# Patient Record
Sex: MALE | Race: WHITE
[De-identification: names, ages, dates, MRNs, and addresses within clinical notes are randomized per-mention and may not be internally consistent; named-entity substitution may affect disease eponyms.]

---

## 2017-03-05 ENCOUNTER — HOSPITAL ENCOUNTER (EMERGENCY)
Dept: HOSPITAL 45 - C.EDB | Age: 82
Discharge: HOME | End: 2017-03-05
Payer: COMMERCIAL

## 2017-03-05 VITALS — TEMPERATURE: 97.52 F

## 2017-03-05 VITALS — HEART RATE: 83 BPM | OXYGEN SATURATION: 98 % | SYSTOLIC BLOOD PRESSURE: 116 MMHG | DIASTOLIC BLOOD PRESSURE: 78 MMHG

## 2017-03-05 VITALS
HEIGHT: 67.99 IN | HEIGHT: 67.99 IN | BODY MASS INDEX: 24.86 KG/M2 | WEIGHT: 164.02 LBS | WEIGHT: 164.02 LBS | BODY MASS INDEX: 24.86 KG/M2

## 2017-03-05 DIAGNOSIS — S49.92XA: Primary | ICD-10-CM

## 2017-03-05 DIAGNOSIS — I48.91: ICD-10-CM

## 2017-03-05 DIAGNOSIS — Z79.899: ICD-10-CM

## 2017-03-05 DIAGNOSIS — M54.5: ICD-10-CM
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
## 2017-03-05 DIAGNOSIS — Z90.79: ICD-10-CM

## 2017-03-05 DIAGNOSIS — Z85.46: ICD-10-CM

## 2017-03-05 DIAGNOSIS — Z85.72: ICD-10-CM

## 2017-03-05 DIAGNOSIS — Z79.01: ICD-10-CM

## 2017-03-05 DIAGNOSIS — Z86.718: ICD-10-CM

## 2017-03-05 NOTE — DIAGNOSTIC IMAGING REPORT
LUMBAR SPINE 5 VIEWS



HISTORY: Trauma  fall eval for fx



COMPARISON: None.



FINDINGS: There is no fracture. No subluxation. Significant degenerative disc

change. Significant degenerative change of vertebral endplates.



IMPRESSION:  

Significant degenerative change. No acute process.







Electronically signed by:  Raj Powell M.D.

3/5/2017 4:07 PM



Dictated Date/Time:  3/5/2017 4:06 PM

## 2017-03-05 NOTE — DIAGNOSTIC IMAGING REPORT
LEFT CLAVICLE



CLINICAL HISTORY: left eval for fx trauma. Pain.



COMPARISON: None.



DISCUSSION: The bones and joint spaces appear intact. There is no evidence of

fracture, dislocation or bony disease. Moderate degenerative change

acromioclavicular joint. Cortical margins are intact.



IMPRESSION: No acute process.







Electronically signed by:  Raj Powell M.D.

3/5/2017 4:08 PM



Dictated Date/Time:  3/5/2017 4:07 PM

## 2017-03-05 NOTE — DIAGNOSTIC IMAGING REPORT
LEFT ELBOW MIN 3 VIEWS ROUTINE



CLINICAL HISTORY: fall eval for fx trauma. Pain.



COMPARISON: None.



DISCUSSION: The bones and joint spaces appear intact. There is no evidence of

fracture, dislocation or bony disease. Mild degenerative change. Bone spur

projecting from the proximal ulna.



IMPRESSION: No acute bony abnormality







Electronically signed by:  Raj Powell M.D.

3/5/2017 4:09 PM



Dictated Date/Time:  3/5/2017 4:08 PM

## 2017-03-05 NOTE — EMERGENCY ROOM VISIT NOTE
History


Report prepared by [place]:  Juan Mendenhall


Under the Supervision of:  Dr. Jose Miguel eLroy M.D.


First contact with patient:  14:43


Chief Complaint:  FALL


Stated Complaint:  SORE CLAVICAL AND LWR RIGHT BACK SIDE-FROM A FALL





History of Present Illness


The patient is an 88 year old male who presents to the Emergency Room with 

complaints of a persistent sore clavicle that started yesterday after a fall at 

1000. The patient says that he was standing on a step stool trying to get 

something off the top shelf of a closet, when he lost his balance and fell onto 

his left arm and shoulder.  He lives at the [facility] at [facility], so he had 

one of the nurses put a bandage on his left elbow. Ever since the fall, he has 

had a sore clavicle and lower back pain. He states that his lower back pain is 

in the muscles.  Yesterday, he had bilateral lower back pain, but today, the 

pain has localized to the lower right side. The patient did have elbow pain for 

a few hours after the fall, but the pain has gone away. He denies any loss of 

consciousness, headache, neck pain, hip pain, or hematuria. He did not hit his 

head. The patient is on Coumadin. He has atrial fibrillation.





   Source of History:  patient


   Onset:  Yesterday at 1000


   Position:  other (clavicle)


   Quality:  other (soreness)


   Timing:  other (persistent)


   Associated Symptoms:  + back pain (lower), No LOC, No headache, No neck pain

, No urinary symptoms (hematuria)


Note:


Associated symptoms: Had left elbow pain for a few hours after the fall. Denies 

hip pain.





Review of Systems


See HPI for pertinent positives & negatives. A total of 10 systems reviewed and 

were otherwise negative.





Past Medical & Surgical


Medical Problems:


(1) Atrial fibrillation


(2) CHF (congestive heart failure)


(3) DVT (deep venous thrombosis)


(4) H/O blood clots


(5) Heart disease


(6) Lyme disease


(7) Mantle cell lymphoma


(8) Non-Hodgkin lymphoma


(9) Prostate cancer


(10) Shingles


Surgical Problems:


(1) H/O prostatectomy


(2) S/P appendectomy








Family History





Diabetes mellitus


FH: cancer


FH: heart disease


FH: lung disease


Hypertension





Social History


Smoking Status:  Never Smoker


Alcohol Use:  occasionally


Marital Status:  


Housing Status:  lives alone


Occupation Status:  unemployed





Current/Historical Medications


Scheduled


Bicalutamide (Casodex), 50 MG PO DAILY


Calcium Carbonate-Vitamin D (Calcium + D), 1 TAB PO DAILY


Fish Oil ([place]-3), 1 CAP PO DAILY


Gabapentin (Neurontin), 300 MG PO BID


Leuprolide Acetate (Lupron Depot), 7.5 MG INJ Q4MO


Multivitamin (Multivitamin), 1 TAB PO DAILY


Warfarin Sod (Jantoven), 7.5 MG PO 4XWK


Warfarin Sodium (Coumadin), 5 MG PO 3XWK





Scheduled PRN


Furosemide (Furosemide), 0-40 MG PO DAILY PRN for Weight Gain Of 3#


Ondansetron (Ondansetron HCl), 8 MG PO UD PRN for Nausea or Vomiting





Allergies


Coded Allergies:  


     Acetazolamide (Verified  Allergy, Unknown, Diamox Eye Drops ? rxn, 5/26/15)





Physical Exam


Vital Signs











  Date Time  Temp Pulse Resp B/P Pulse Ox O2 Delivery O2 Flow Rate FiO2


 


3/5/17 16:26  83 16 116/78 98   


 


3/5/17 14:30 36.4 80 18 119/82 99 Room Air  











Physical Exam





Constitutional: Vital signs reviewed.


Eyes: Pupils are equal round reactive to light.  Conjunctiva are noninjected.  


ENT: Pharynx is clear without erythema or exudate.  Mucous membranes are moist.

  Neck supple without meningeal signs.


Respiratory: Clear to auscultation bilaterally.  Breath sounds are equal 

bilaterally. 


Cardiovascular: Regular rate and rhythm.  No rubs or gallops.


GI: Soft, nondistended and nontender.  Bowel sounds are present.


Musculoskeletal: No peripheral edema.  No lower extremity tenderness.  Proximal 

clavicular tenderness.  No sternal tenderness.  No midline tenderness to 

cervical, thoracic, or lumbosacral spine. No hip tenderness.


Integumentary: No cyanosis. Skin tear left elbow.


Neurological: The patient is awake and alert.  No focal deficits.


Psychiatric: Normal affect.





Medical Decision & Procedures


ER Provider


Diagnostic Interpretation:


X-ray results as stated below per interpretation by me and the radiologist: 








LUMBAR SPINE 5 VIEWS





HISTORY: Trauma  fall eval for fx





COMPARISON: None.





FINDINGS: There is no fracture. No subluxation. Significant degenerative disc


change. Significant degenerative change of vertebral endplates.





IMPRESSION:  


Significant degenerative change. No acute process.





Electronically signed by:  Raj Powell M.D.


3/5/2017 4:07 PM





Dictated Date/Time:  3/5/2017 4:06 PM











LEFT ELBOW MIN 3 VIEWS ROUTINE





CLINICAL HISTORY: fall eval for fx trauma. Pain.





COMPARISON: None.





DISCUSSION: The bones and joint spaces appear intact. There is no evidence of


fracture, dislocation or bony disease. Mild degenerative change. Bone spur


projecting from the proximal ulna.





IMPRESSION: No acute bony abnormality





Electronically signed by:  Raj Powell M.D.


3/5/2017 4:09 PM





Dictated Date/Time:  3/5/2017 4:08 PM














LEFT CLAVICLE





CLINICAL HISTORY: left eval for fx trauma. Pain.





COMPARISON: None.





DISCUSSION: The bones and joint spaces appear intact. There is no evidence of


fracture, dislocation or bony disease. Moderate degenerative change


acromioclavicular joint. Cortical margins are intact.





IMPRESSION: No acute process.





Electronically signed by:  Raj Powell M.D.


3/5/2017 4:08 PM





Dictated Date/Time:  3/5/2017 4:07 PM





ED Course


1447: The patient was evaluated in room B2. A complete history and physical 

exam was performed.





1614: I reevaluated the patient and talked to him about his test results. He is 

resting comfortably. The patient verbally expressed understanding and agreement 

of the treatment plan. The patient will be discharged.





Medical Decision


This is an 88-year-old male who presents with injuries after a fall.  

Differential diagnosis includes contusion, strain, compression fracture, 

clavicular fracture.  I did perform a limited focused review of portions of the 

patient's old chart on the electronic medical record. The patient had an INR of 

2.2 on February 1st.





I did evaluate the patient as noted above.  Patient had a mechanical fall 

yesterday.  He injured his left arm and his back.  He denies any head injury or 

headache.  He denies any neck pain.  I did order and personally review the 

patient's x-rays as described above.  The patient does not have any acute 

fractures.  I did discuss the test results with the patient.  I did recommend 

Tylenol for pain.  He was given an arm sling and advised follow up with his 

doctor.  He was discharged in good condition.





Impression





 Primary Impression:  


 Left upper arm injury


 Additional Impressions:  


 Low back pain


 Fall





Scribe Attestation


The scribe's documentation has been prepared under my direct and personally 

reviewed by me in its entirety. I confirm that the note above accurately 

reflects all work, treatment, procedures, and medical decision making performed 

by me.





Departure Information


Dispostion


Home / Self-Care





Referrals


No Doctor, Assigned (PCP)








Oliver Chen M.D.





Forms


HOME CARE DOCUMENTATION FORM,                                                 

               IMPORTANT VISIT INFORMATION





Patient Instructions


Low Back Pain Self Care, My [facility]





Additional Instructions





You have been examined and treated today on an emergency basis only. This is 

not a substitute for, or an effort to provide, complete comprehensive medical 

care. It is impossible to recognize and treat all injuries or illnesses in a 

single emergency department visit. It is therefore important that you follow up 

closely with your physician.  Call as soon as possible for an appointment.  

Return for worsening symptoms or if you develop fever, vomiting, headache or 

numbness or weakness in your arms or legs or any other concerning symptoms.





Problem Qualifiers








 Primary Impression:  


 Left upper arm injury


 Encounter type:  initial encounter  Qualified Codes:  S49.92XA - Unspecified 

injury of left shoulder and upper arm, initial encounter


 Additional Impressions:  


 Low back pain


 Chronicity:  acute  Back pain laterality:  right  Sciatica presence:  without 

sciatica  Qualified Codes:  M54.5 - Low back pain


 Fall


 Encounter type:  initial encounter  Qualified Codes:  W19.XXXA - Unspecified 

fall, initial encounter

## 2017-03-23 ENCOUNTER — HOSPITAL ENCOUNTER (OUTPATIENT)
Dept: HOSPITAL 45 - C.LABSPEC | Age: 82
Discharge: HOME | End: 2017-03-23
Attending: PHYSICIAN ASSISTANT
Payer: COMMERCIAL

## 2017-03-23 DIAGNOSIS — Z79.01: ICD-10-CM

## 2017-03-23 DIAGNOSIS — I82.409: ICD-10-CM
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
## 2017-03-23 DIAGNOSIS — Z51.81: ICD-10-CM

## 2017-03-23 DIAGNOSIS — S51.802A: Primary | ICD-10-CM

## 2017-07-26 ENCOUNTER — HOSPITAL ENCOUNTER (OUTPATIENT)
Dept: HOSPITAL 45 - C.RAD1850 | Age: 82
Discharge: HOME | End: 2017-07-26
Attending: INTERNAL MEDICINE
Payer: COMMERCIAL

## 2017-07-26 DIAGNOSIS — Z79.01: ICD-10-CM

## 2017-07-26 DIAGNOSIS — Z51.81: ICD-10-CM

## 2017-07-26 DIAGNOSIS — I82.409: ICD-10-CM

## 2017-07-26 DIAGNOSIS — M54.5: Primary | ICD-10-CM

## 2017-07-26 NOTE — DIAGNOSTIC IMAGING REPORT
L-SPINE MIN 4 VIEWS ROUTINE



CLINICAL HISTORY: 89 years-old Male presenting with low back pain, fall one week

ago. 



TECHNIQUE: Frontal, bilateral oblique, and lateral views of the lumbar spine and

coned-down lateral view of the lumbosacral junction were obtained. 



COMPARISON:  3/15/2017.



FINDINGS:

Vertebral body heights and alignment maintained. Intervertebral disc height loss

at L5-S1 as on prior exam. Possible mild bilateral osseous neural foraminal

narrowing at L5 S1. Mild endplate concavities could suggest osteoporosis. Mild

degenerative changes with anterior osteophytosis. Numerous surgical clips noted

in the pelvis, possibly from prior prostatectomy. Atherosclerosis.

Nonobstructive bowel gas pattern.



IMPRESSION:

Degenerative changes of the lumbar spine most severe at L5-S1.



Suggestion of osteoporosis without evidence of compression deformity.







Electronically signed by:  Gurmeet Llanes M.D.

7/26/2017 4:58 PM



Dictated Date/Time:  7/26/2017 4:56 PM

## 2017-08-08 ENCOUNTER — HOSPITAL ENCOUNTER (OUTPATIENT)
Dept: HOSPITAL 45 - C.LAB1850 | Age: 82
Discharge: HOME | End: 2017-08-08
Attending: PHYSICIAN ASSISTANT
Payer: COMMERCIAL

## 2017-08-08 DIAGNOSIS — R53.83: ICD-10-CM

## 2017-08-08 DIAGNOSIS — R39.9: Primary | ICD-10-CM

## 2017-08-08 LAB
ALBUMIN/GLOB SERPL: 1 {RATIO} (ref 0.9–2)
ALP SERPL-CCNC: 179 U/L (ref 45–117)
ALT SERPL-CCNC: 36 U/L (ref 12–78)
ANION GAP SERPL CALC-SCNC: 5 MMOL/L (ref 3–11)
APPEARANCE UR: CLEAR
AST SERPL-CCNC: 31 U/L (ref 15–37)
BASOPHILS # BLD: 0.03 K/UL (ref 0–0.2)
BASOPHILS NFR BLD: 0.5 %
BILIRUB UR-MCNC: (no result) MG/DL
BUN SERPL-MCNC: 26 MG/DL (ref 7–18)
BUN/CREAT SERPL: 20.2 (ref 10–20)
CALCIUM SERPL-MCNC: 9.3 MG/DL (ref 8.5–10.1)
CHLORIDE SERPL-SCNC: 103 MMOL/L (ref 98–107)
CO2 SERPL-SCNC: 32 MMOL/L (ref 21–32)
COLOR UR: YELLOW
COMPLETE: YES
CREAT SERPL-MCNC: 1.3 MG/DL (ref 0.6–1.4)
EOSINOPHIL NFR BLD AUTO: 212 K/UL (ref 130–400)
GLOBULIN SER-MCNC: 3.5 GM/DL (ref 2.5–4)
GLUCOSE SERPL-MCNC: 123 MG/DL (ref 70–99)
HCT VFR BLD CALC: 43.9 % (ref 42–52)
IG%: 0.2 %
IMM GRANULOCYTES NFR BLD AUTO: 27.3 %
LYMPHOCYTES # BLD: 1.72 K/UL (ref 1.2–3.4)
MANUAL MICROSCOPIC REQUIRED?: NO
MCH RBC QN AUTO: 30.6 PG (ref 25–34)
MCHC RBC AUTO-ENTMCNC: 32.1 G/DL (ref 32–36)
MCV RBC AUTO: 95.2 FL (ref 80–100)
MONOCYTES NFR BLD: 7.9 %
NEUTROPHILS # BLD AUTO: 3.8 %
NEUTROPHILS NFR BLD AUTO: 60.3 %
NITRITE UR QL STRIP: (no result)
PH UR STRIP: 6 [PH] (ref 4.5–7.5)
PMV BLD AUTO: 11.4 FL (ref 7.4–10.4)
POTASSIUM SERPL-SCNC: 4.3 MMOL/L (ref 3.5–5.1)
RBC # BLD AUTO: 4.61 M/UL (ref 4.7–6.1)
REVIEW REQ?: NO
SODIUM SERPL-SCNC: 140 MMOL/L (ref 136–145)
SP GR UR STRIP: 1.02 (ref 1–1.03)
URINE EPITHELIAL CELL AUTO: >30 /LPF (ref 0–5)
UROBILINOGEN UR-MCNC: (no result) MG/DL
WBC # BLD AUTO: 6.3 K/UL (ref 4.8–10.8)

## 2017-08-11 ENCOUNTER — HOSPITAL ENCOUNTER (EMERGENCY)
Dept: HOSPITAL 45 - C.EDB | Age: 82
Discharge: HOME | End: 2017-08-11
Payer: COMMERCIAL

## 2017-08-11 ENCOUNTER — HOSPITAL ENCOUNTER (OUTPATIENT)
Dept: HOSPITAL 45 - C.LAB1850 | Age: 82
Discharge: HOME | End: 2017-08-11
Attending: PHYSICIAN ASSISTANT
Payer: COMMERCIAL

## 2017-08-11 VITALS — TEMPERATURE: 97.52 F

## 2017-08-11 VITALS
HEIGHT: 69.02 IN | WEIGHT: 172.4 LBS | BODY MASS INDEX: 25.53 KG/M2 | HEIGHT: 69.02 IN | WEIGHT: 172.4 LBS | BODY MASS INDEX: 25.53 KG/M2

## 2017-08-11 VITALS — SYSTOLIC BLOOD PRESSURE: 160 MMHG | OXYGEN SATURATION: 98 % | HEART RATE: 82 BPM | DIASTOLIC BLOOD PRESSURE: 103 MMHG

## 2017-08-11 DIAGNOSIS — Z79.01: ICD-10-CM

## 2017-08-11 DIAGNOSIS — Z86.718: ICD-10-CM

## 2017-08-11 DIAGNOSIS — R79.9: ICD-10-CM

## 2017-08-11 DIAGNOSIS — R79.9: Primary | ICD-10-CM

## 2017-08-11 DIAGNOSIS — Z83.3: ICD-10-CM

## 2017-08-11 DIAGNOSIS — Z90.79: ICD-10-CM

## 2017-08-11 DIAGNOSIS — I48.91: ICD-10-CM

## 2017-08-11 DIAGNOSIS — Z85.72: ICD-10-CM

## 2017-08-11 DIAGNOSIS — Z80.9: ICD-10-CM

## 2017-08-11 DIAGNOSIS — Z82.49: ICD-10-CM

## 2017-08-11 DIAGNOSIS — E86.0: Primary | ICD-10-CM

## 2017-08-11 DIAGNOSIS — Z79.899: ICD-10-CM

## 2017-08-11 DIAGNOSIS — I50.9: ICD-10-CM

## 2017-08-11 DIAGNOSIS — Z85.46: ICD-10-CM

## 2017-08-11 LAB
ALP SERPL-CCNC: 182 U/L (ref 45–117)
ALT SERPL-CCNC: 33 U/L (ref 12–78)
ANION GAP SERPL CALC-SCNC: 7 MMOL/L (ref 3–11)
ANION GAP SERPL CALC-SCNC: 8 MMOL/L (ref 3–11)
APPEARANCE UR: CLEAR
AST SERPL-CCNC: 30 U/L (ref 15–37)
BILIRUB UR-MCNC: (no result) MG/DL
BUN SERPL-MCNC: 30 MG/DL (ref 7–18)
BUN SERPL-MCNC: 30 MG/DL (ref 7–18)
BUN/CREAT SERPL: 20.1 (ref 10–20)
BUN/CREAT SERPL: 21.1 (ref 10–20)
CALCIUM SERPL-MCNC: 9.6 MG/DL (ref 8.5–10.1)
CALCIUM SERPL-MCNC: 9.7 MG/DL (ref 8.5–10.1)
CHLORIDE SERPL-SCNC: 104 MMOL/L (ref 98–107)
CHLORIDE SERPL-SCNC: 104 MMOL/L (ref 98–107)
CO2 SERPL-SCNC: 28 MMOL/L (ref 21–32)
CO2 SERPL-SCNC: 29 MMOL/L (ref 21–32)
COLOR UR: YELLOW
CREAT CL PREDICTED SERPL C-G-VRATE: 35.8 ML/MIN
CREAT SERPL-MCNC: 1.4 MG/DL (ref 0.6–1.4)
CREAT SERPL-MCNC: 1.5 MG/DL (ref 0.6–1.4)
GLUCOSE SERPL-MCNC: 100 MG/DL (ref 70–99)
GLUCOSE SERPL-MCNC: 159 MG/DL (ref 70–99)
MANUAL MICROSCOPIC REQUIRED?: NO
NITRITE UR QL STRIP: (no result)
PH UR STRIP: 6.5 [PH] (ref 4.5–7.5)
POINT OF CARE PRO-BNP: 1632 PG/ML (ref 0–1800)
POINT OF CARE TROPONIN I: < 0.03 NG/ML (ref 0–0.04)
POTASSIUM SERPL-SCNC: 4.4 MMOL/L (ref 3.5–5.1)
POTASSIUM SERPL-SCNC: 4.4 MMOL/L (ref 3.5–5.1)
REVIEW REQ?: NO
SODIUM SERPL-SCNC: 140 MMOL/L (ref 136–145)
SODIUM SERPL-SCNC: 140 MMOL/L (ref 136–145)
SP GR UR STRIP: 1.01 (ref 1–1.03)
URINE BILL WITH OR WITHOUT MIC: (no result)
URINE EPITHELIAL CELL AUTO: >30 /LPF (ref 0–5)
UROBILINOGEN UR-MCNC: (no result) MG/DL

## 2017-08-11 NOTE — DIAGNOSTIC IMAGING REPORT
CHEST ONE VIEW PORTABLE



HISTORY: Atypical  CHEST PAIN



COMPARISON: Chest 9/15/2016.



FINDINGS: No focal lung consolidations to suggest pneumonia. No evidence for

pulmonary edema. No pleural effusions. No pneumothorax. The heart remains mildly

enlarged. Old, healed right rib fractures.



IMPRESSION:

Stable mild cardiomegaly. No acute process within the chest.







Electronically signed by:  Geremias Schmidt M.D.

8/11/2017 6:14 PM



Dictated Date/Time:  8/11/2017 6:13 PM

## 2017-08-11 NOTE — EMERGENCY ROOM VISIT NOTE
History


Report prepared by [place]:  Maliha Vázquez


Under the Supervision of:  Dr. Juan Carlos Bran M.D.


First contact with patient:  17:47


Chief Complaint:  HYPOTENSION


Stated Complaint:  LOW BP,MT&W,LIGHTHEADED,DEHYDRATED





History of Present Illness


The patient is an 89 year old male who presents to the Emergency Room with 

complaints of persistent low blood pressure starting 4 days ago. He has had 

episodes of low blood pressure 3-4 times in the past. He saw his PCP yesterday 

and had blood work. He received a call today saying that he should go to the ED 

over concerns of dehydration and kidney problems. He reports feeling 

lightheaded at times especially with standing up. He has had dysuria for a 

couple weeks. He denies any fever, black or bloody stools, chest pain, SOB, or 

abdominal pain. He denies any recent falls or injury. He believes that he is 

keeping up with his fluids, but his family does not think he does. He has a 

history of CHF and is on a diuretic. He does not have more leg swelling than 

usual. He denies any history of diabetes.





   Source of History:  patient, family


   Onset:  4 days ago


   Position:  other (global)


   Quality:  other (low blood pressure)


   Timing:  other (persistent)


   Associated Symptoms:  + urinary symptoms, No fevers, No chest pain, No SOB, 

No abdominal pain, No melena, No hematochezia


Note:


Pt reports feeling lightheaded, dysuria.





Review of Systems


See HPI for pertinent positives & negatives. A total of 10 systems reviewed and 

were otherwise negative.





Past Medical & Surgical


Medical Problems:


(1) Atrial fibrillation


(2) CHF (congestive heart failure)


(3) DVT (deep venous thrombosis)


(4) H/O blood clots


(5) Heart disease


(6) Lyme disease


(7) Mantle cell lymphoma


(8) Non-Hodgkin lymphoma


(9) Prostate cancer


(10) Shingles


Surgical Problems:


(1) H/O prostatectomy


(2) S/P appendectomy





Old medical records were reviewed. Nurse's notes were reviewed and I agree with.





Family History





Diabetes mellitus


FH: cancer


FH: heart disease


FH: lung disease


Hypertension





Social History


Smoking Status:  Never Smoker


Alcohol Use:  occasionally


Marital Status:  


Housing Status:  lives alone


Occupation Status:  unemployed





Current/Historical Medications


Scheduled


Bicalutamide (Casodex), 50 MG PO DAILY


Calcium Carbonate-Vitamin D (Calcium + D), 1 TAB PO DAILY


Fish Oil ([place]-3), 1 CAP PO DAILY


Gabapentin (Neurontin), 300 MG PO BID


Leuprolide Acetate (Lupron Depot), 7.5 MG INJ Q4MO


Multivitamin (Multivitamin), 1 TAB PO DAILY


Warfarin Sodium (Coumadin), 5 MG PO 6XWK


Warfarin Sodium (Coumadin), 7.5 MG PO tues





Scheduled PRN


Furosemide (Furosemide), 0-40 MG PO DAILY PRN for Weight Gain Of 3#


Ondansetron (Ondansetron HCl), 8 MG PO UD PRN for Nausea or Vomiting





Allergies


Coded Allergies:  


     Acetazolamide (Verified  Allergy, Unknown, Diamox Eye Drops ? rxn, 8/11/17)





Physical Exam


Vital Signs











  Date Time  Temp Pulse Resp B/P (MAP) Pulse Ox O2 Delivery O2 Flow Rate FiO2


 


8/11/17 21:01  82 18 160/103 98   


 


8/11/17 18:53  83 18 179/91 97 Room Air  


 


8/11/17 16:24 36.4 77 16 130/82 100 Room Air  











Physical Exam


General: Non ill appearing older male in no acute distress. Well developed well 

nourished, breathing comfortably on room air. Normal speech


HEENT: Normal cephalic atraumatic.  Pupils are equal round and reactive to 

light.  Extraocular movements are intact.  Oropharynx is pink with moist mucous 

membranes.  No swelling of the mouth lips or tongue.


Neck: Supple with a midline trachea.  No meningeal signs or stiffness, no JVD 

or bruits. No Stridor.


Chest: Clear to auscultation bilaterally.  No wheezes or rhonchi.  No increased 

work of breathing.


Heart: regular rate and rhythm. 


Abdomen: Soft nontender, nondistended without rebound guarding or rigidity.  


Extremities: No cyanosis clubbing.  Mild baseline peripheral edema. No calf 

tenderness or assymetry


Spine/Back. Non tender to palpation. No CVA tenderness


Skin: Good turgor without rashes.


Neurologic exam: Cranial nerves two through 12 are intact.  Motor and sensation 

are intact and symmetrical throughout.





Medical Decision & Procedures


ER Provider


Diagnostic Interpretation:


X-ray results as stated below per interpretation by me and the radiologist: 





CHEST ONE VIEW PORTABLE





HISTORY: Atypical  CHEST PAIN





COMPARISON: Chest 9/15/2016.





FINDINGS: No focal lung consolidations to suggest pneumonia. No evidence for


pulmonary edema. No pleural effusions. No pneumothorax. The heart remains mildly


enlarged. Old, healed right rib fractures.





IMPRESSION:


Stable mild cardiomegaly. No acute process within the chest.





Electronically signed by:  Geremias Schmidt M.D.


8/11/2017 6:14 PM





Dictated Date/Time:  8/11/2017 6:13 PM





Laboratory Results


8/11/17 18:18

















Test


  8/11/17


18:18 8/11/17


18:25


 


Anion Gap


  7.0 mmol/L


(3-11) 


 


 


Est Creatinine Clear Calc


Drug Dose 35.8 ml/min 


  


 


 


Estimated GFR (


American) 51.3 


  


 


 


Estimated GFR (Non-


American 44.2 


  


 


 


BUN/Creatinine Ratio 21.1 (10-20)  


 


Calcium Level


  9.7 mg/dl


(8.5-10.1) 


 


 


Total Bilirubin


  0.8 mg/dl


(0.2-1) 


 


 


Direct Bilirubin


  0.2 mg/dl


(0-0.2) 


 


 


Aspartate Amino Transf


(AST/SGOT) 30 U/L (15-37) 


  


 


 


Alanine Aminotransferase


(ALT/SGPT) 33 U/L (12-78) 


  


 


 


Alkaline Phosphatase


  182 U/L


() 


 


 


Total Protein


  7.7 gm/dl


(6.4-8.2) 


 


 


Albumin


  3.8 gm/dl


(3.4-5.0) 


 


 


Lipase


  173 U/L


() 


 


 


Urine Color  YELLOW 


 


Urine Appearance  CLEAR (CLEAR) 


 


Urine pH  6.5 (4.5-7.5) 


 


Urine Specific Gravity


  


  1.013


(1.000-1.030)


 


Urine Protein  NEG (NEG) 


 


Urine Glucose (UA)  NEG (NEG) 


 


Urine Ketones  NEG (NEG) 


 


Urine Occult Blood  NEG (NEG) 


 


Urine Nitrite  NEG (NEG) 


 


Urine Bilirubin  NEG (NEG) 


 


Urine Urobilinogen  NEG (NEG) 


 


Urine Leukocyte Esterase  SMALL (NEG) 


 


Urine WBC (Auto)


  


  10-30 /hpf


(0-5)


 


Urine RBC (Auto)  0-4 /hpf (0-4) 


 


Urine Hyaline Casts (Auto)  1-5 /lpf (0-5) 


 


Urine Epithelial Cells (Auto)  >30 /lpf (0-5) 


 


Urine Bacteria (Auto)  NEG (NEG) 


 


Bedside Troponin I


  


  < 0.030 ng/ml


(0-0.045)


 


NT-Pro-B-Type Natriuretic


Peptide 


  1632 pg/ml


(0-1800)





Laboratory studies as stated above per my review.





Medications Administered











 Medications


  (Trade)  Dose


 Ordered  Sig/Maria Ines


 Route  Start Time


 Stop Time Status Last Admin


Dose Admin


 


 Sodium Chloride  500 ml @ 


 999 mls/hr  Q31M STAT


 IV  8/11/17 17:57


 8/11/17 18:27 DC 8/11/17 18:52


999 MLS/HR


 


 Sodium Chloride  1,000 ml @ 


 150 mls/hr  Q6H40M ONCE


 IV  8/11/17 17:57


 8/11/17 21:23 DC 8/11/17 19:23


150 MLS/HR











ECG


Indication:  weakness


Rate (beats per minute):  76


Rhythm:  atrial fibrillation


Findings:  PVC, other (nonspecific T wave abnormality)


Comparison ECG Date:  23-Mar-2016


Change:  no significant change





ED Course


1748: Past medical records reviewed. The patient was evaluated in room C12A, 

and a complete history and physical examination were performed.





1757: NSS 1000 ml @ 150 mls/hr IV,  ml @ 999 mls/hr IV. 





2018: Upon reevaluation, the patient is doing well. I discussed the results and 

treatment plan with him and his family. They verbalized agreement of the 

treatment plan. The patient was discharged home.





Medical Decision


Differentials include, but are not limited to; dehydration, electrolyte or 

metabolic abnormality, arrhythmia, infection, anemia. 





This patient comes in as described above.  He was placed on cardiac monitor and 

room [facility] by his doctor for IV hydration.  His BUN/creatinine been 

mildly elevated compared to baseline.  He apparently was hypotensive early the 

week but is normotensive here.  He looks great and he has no complaints.  He 

has had no chest pain or shortness of breath .  No fever or chills or anything 

to suggest infection.  His white count earlier today was normal. His hemoglobin 

was stable as well.  He had an INR checked yesterday was in the 3 range. he's 

had no fall or trauma . he's had no blood or melena in his stool. He has had no 

abdominal pain or anything to suggest he is losing blood anywhere.  EKG shows 

baseline A. fib but has no acute ischemic changes or other arrhythmia.  He has 

no other significant lab abnormalities.  He was gently hydrated with a 500 mL 

IV normal saline bolus and received approximately about 750 mL total.  His 

lungs were are clear and his chest x-ray does not show overt CHF.  He feels 

good and would like to go home. I will discharge him home.  He should increase 

his fluid intake slightly over this weekend and return if: fever or chills, 

worsening of symptoms, any new problems or concerns and follow-up with his 

doctor Monday for recheck.  He was happy the plan and discharged to home.





Medication Reconcilliation


Current Medication List:  was personally reviewed by me





Blood Pressure Screening


Patient's blood pressure:  Normal blood pressure


Blood pressure disposition:  Did not require urgent referral





Impression





 Primary Impression:  


 Dehydration





Scribe Attestation


The scribe's documentation has been prepared under my direction and personally 

reviewed by me in its entirety. I confirm that the note above accurately 

reflects all work, treatment, procedures, and medical decision making performed 

by me.





Departure Information


Dispostion


Home / Self-Care





Referrals


Oliver Chen M.D. (PCP)





Forms


HOME CARE DOCUMENTATION FORM,                                                 

               IMPORTANT VISIT INFORMATION, WORK / SCHOOL INSTRUCTIONS





Patient Instructions


My [facility]





Additional Instructions





Rest.


Increased fluid intake over the weekend





Be careful getting up and down.





Return if: Worsening of symptoms, chest pain, shortness of breath, dizziness, 

blood in your stool, pain, any new problems or concerns





Follow-up with your doctor on Monday for recheck

## 2017-09-12 LAB
ALBUMIN/GLOB SERPL: 1 {RATIO} (ref 0.9–2)
ALP SERPL-CCNC: 163 U/L (ref 45–117)
ALT SERPL-CCNC: 28 U/L (ref 12–78)
ANION GAP SERPL CALC-SCNC: 6 MMOL/L (ref 3–11)
AST SERPL-CCNC: 33 U/L (ref 15–37)
BUN SERPL-MCNC: 23 MG/DL (ref 7–18)
BUN/CREAT SERPL: 20.5 (ref 10–20)
CALCIUM SERPL-MCNC: 9.5 MG/DL (ref 8.5–10.1)
CHLORIDE SERPL-SCNC: 104 MMOL/L (ref 98–107)
CO2 SERPL-SCNC: 30 MMOL/L (ref 21–32)
CREAT SERPL-MCNC: 1.1 MG/DL (ref 0.6–1.4)
GLOBULIN SER-MCNC: 3.8 GM/DL (ref 2.5–4)
GLUCOSE SERPL-MCNC: 99 MG/DL (ref 70–99)
POTASSIUM SERPL-SCNC: 3.8 MMOL/L (ref 3.5–5.1)
PSA FREE SERPL-MCNC: 0.03 NG/ML
PSA SERPL-MCNC: 0.28 NG/ML (ref 0–4)
SODIUM SERPL-SCNC: 140 MMOL/L (ref 136–145)

## 2017-09-14 ENCOUNTER — HOSPITAL ENCOUNTER (OUTPATIENT)
Dept: HOSPITAL 45 - C.CTS | Age: 82
Discharge: HOME | End: 2017-09-14
Attending: UROLOGY
Payer: COMMERCIAL

## 2017-09-14 DIAGNOSIS — K80.20: ICD-10-CM

## 2017-09-14 DIAGNOSIS — K57.90: ICD-10-CM

## 2017-09-14 DIAGNOSIS — R32: ICD-10-CM

## 2017-09-14 DIAGNOSIS — N32.89: ICD-10-CM

## 2017-09-14 DIAGNOSIS — R31.29: ICD-10-CM

## 2017-09-14 DIAGNOSIS — C61: Primary | ICD-10-CM

## 2017-09-14 NOTE — DIAGNOSTIC IMAGING REPORT
******** ADDENDUM ********





Possible focus of asymmetric enhancement at the right side of the junction of

the bladder base/urethra best seen on image 402. This measures approximately 12

mm. This could be due to collapse of the normal bladder base. However, a focal

area of tumor recurrence given the patient's history of prostate malignancy

cannot be excluded. This may account for the patient's possible bladder outlet

obstruction. Direct visualization is recommended for further evaluation. This

finding was called/faxed to the referring physician's office.







Electronically signed by:  Geremias Schmidt M.D.

9/14/2017 9:22 AM



Dictated Date/Time:  9/14/2017 9:19 AM



******** ORIGINAL REPORT ********





ABDOMEN AND PELVIS CT WITH AND WITHOUT IV CONTRAST, UROGRAM PROTOCOL



CT DOSE: 1471.26 mGycm



HISTORY:      HEMATURIA,INCONTINENCE,NEOPLASM OF PROSTATE



TECHNIQUE: Multiaxial CT images of the abdomen and pelvis were performed both

before and after the use of intravenous contrast to evaluate the urinary system.

Maximal intensity projection images were performed at the workstation by the

radiologist.  A dose lowering technique was utilized adhering to the principles

of ALARA.



COMPARISON STUDY: Abdomen and pelvis CT 1/20/2011.



FINDINGS: No renal or ureteral calculi. No hydronephrosis.  No suspicious

filling defects seen within the bilateral renal collecting systems, ureters, or

bladder. Of note, the distal bilateral ureters and bladder not well opacified.

Moderate to severe bladder distention. 

 

Old mild superior endplate compression deformity at L2. A 4 mm subpleural nodule

within the right middle lobe on image 28. Mild dependent changes seen at the

lung bases. Calcified granuloma within the left lower lobe. Bilateral posterior

pleural thickening. No suspicious lytic or blastic osseous lesions. The heart is

mildly enlarged. There are few diverticula at the duodenum. Calcified gallstone.

Punctate calcified granuloma seen within the spleen. Slightly nodular contour to

the liver consistent with cirrhosis. The adrenal glands and pancreas are

unremarkable. Mild bilateral cortical renal scarring. Otherwise, the kidneys

enhance normally. Subcentimeter retroperitoneal lymph nodes do not meet CT

criteria for pathologic involvement. Moderate calcified plaque within the

arterial structures. No pelvic lymphadenopathy. Postoperative changes consistent

with prior prostatectomy and pelvic lymph node dissection. Colonic

diverticulosis. No bowel wall thickening or obstruction.







IMPRESSION: 



1. No renal or ureteral stones. No hydronephrosis.

2. No suspicious filling defects seen within the opacified bilateral renal

collecting systems, ureters, or bladder as described above. 3. Moderate to

severe bladder distention.

4. Prior prostatectomy.

5. Cholelithiasis.

6. Slightly nodular contour to the liver consistent with cirrhosis.

7.. Colonic diverticulosis







Electronically signed by:  Geremias Schmidt M.D.

9/14/2017 9:12 AM



Dictated Date/Time:  9/14/2017 9:00 AM

## 2017-09-16 LAB — TESTOST SERPL-MCNC: <1 NG/DL (ref 250–1100)

## 2017-09-18 ENCOUNTER — HOSPITAL ENCOUNTER (OUTPATIENT)
Dept: HOSPITAL 45 - C.LABSPEC | Age: 82
Discharge: HOME | End: 2017-09-18
Attending: UROLOGY
Payer: COMMERCIAL

## 2017-09-18 DIAGNOSIS — R32: ICD-10-CM

## 2017-09-18 DIAGNOSIS — C61: Primary | ICD-10-CM

## 2017-09-18 DIAGNOSIS — R31.29: ICD-10-CM

## 2017-10-02 ENCOUNTER — HOSPITAL ENCOUNTER (OUTPATIENT)
Dept: HOSPITAL 45 - C.NUCL | Age: 82
Discharge: HOME | End: 2017-10-02
Attending: UROLOGY
Payer: COMMERCIAL

## 2017-10-02 DIAGNOSIS — R31.0: ICD-10-CM

## 2017-10-02 DIAGNOSIS — R93.7: ICD-10-CM

## 2017-10-02 DIAGNOSIS — R33.9: ICD-10-CM

## 2017-10-02 DIAGNOSIS — C61: Primary | ICD-10-CM

## 2017-10-02 NOTE — DIAGNOSTIC IMAGING REPORT
WHOLE BODY BONE SCAN



HISTORY:      C61 Adenocarcinoma of prostate R33.9 Urinary [id number] Gross



RADIOTRACER:  27 mCi Tc-99m MDP



STUDY/IMAGES:  Planar anterior and posterior whole body imaging was performed 3

hours following the intravenous administration of radiotracer.     



COMPARISON:  PET CT 3/9/2016.



FINDINGS: Possible small foci of radiotracer uptake seen within the right

anterior and lateral ribs. These have the typical appearance of old fractures.

Sternoclavicular, right knee, and ankle radiotracer uptake favors degenerative

change. Small focal areas of radiotracer uptake seen within the thoracic and

lumbar spine which is nonspecific but also favors degenerative change. No

suspicious areas of radiotracer uptake seen within the axial or appendicular

skeleton.



IMPRESSION:  



1. No suspicious areas of radiotracer uptake seen within the axial or

appendicular skeleton to suggest metastatic disease.

2. Additional foci of radiotracer uptake favor posttraumatic and degenerative

changes. 







Electronically signed by:  Geremias Schmidt M.D.

10/2/2017 2:18 PM



Dictated Date/Time:  10/2/2017 2:16 PM

## 2017-10-20 ENCOUNTER — HOSPITAL ENCOUNTER (OUTPATIENT)
Dept: HOSPITAL 45 - C.LABVPSUA | Age: 82
Discharge: NURSING, REHAB OR LONG-TERM CARE | End: 2017-10-20
Attending: INTERNAL MEDICINE
Payer: COMMERCIAL

## 2017-10-20 DIAGNOSIS — I48.91: Primary | ICD-10-CM

## 2017-10-20 LAB
INR PPP: 1.1 (ref 0.9–1.1)
PROTHROMBIN TIME: 11.4 SECONDS (ref 9–12)

## 2017-10-23 ENCOUNTER — HOSPITAL ENCOUNTER (OUTPATIENT)
Dept: HOSPITAL 45 - C.LABVPSUA | Age: 82
Discharge: HOME | End: 2017-10-23
Attending: INTERNAL MEDICINE
Payer: COMMERCIAL

## 2017-10-23 DIAGNOSIS — I48.91: Primary | ICD-10-CM

## 2017-10-23 LAB
INR PPP: 1.1 (ref 0.9–1.1)
PROTHROMBIN TIME: 12.2 SECONDS (ref 9–12)

## 2017-10-26 ENCOUNTER — HOSPITAL ENCOUNTER (OUTPATIENT)
Dept: HOSPITAL 45 - C.LABVPSUA | Age: 82
Discharge: HOME | End: 2017-10-26
Attending: INTERNAL MEDICINE
Payer: COMMERCIAL

## 2017-10-26 DIAGNOSIS — I48.91: Primary | ICD-10-CM

## 2017-10-26 LAB
INR PPP: 1.6 (ref 0.9–1.1)
PROTHROMBIN TIME: 18 SECONDS (ref 9–12)

## 2017-11-13 ENCOUNTER — HOSPITAL ENCOUNTER (OUTPATIENT)
Dept: HOSPITAL 45 - C.CTS | Age: 82
Discharge: HOME | End: 2017-11-13
Attending: INTERNAL MEDICINE
Payer: COMMERCIAL

## 2017-11-13 DIAGNOSIS — R59.9: ICD-10-CM

## 2017-11-13 DIAGNOSIS — Z85.46: Primary | ICD-10-CM

## 2017-11-13 DIAGNOSIS — I51.7: ICD-10-CM

## 2017-11-13 NOTE — DIAGNOSTIC IMAGING REPORT
******** ADDENDUM ********





ADDITIONAL FINDINGS:

Upon further review a focal segment of small bowel is noted in the superior

anterior pelvis which demonstrate significant wall thickening. The segment of

involved small bowel is not extensive in length measuring approximately less

than 10 cm. No narrowing of the lumen. Mild overall expansion of the bowel

diameter. No other sites of small bowel wall thickening evident. Mild

perienteric fat stranding most evident along the mesenteric aspect where there

is associated multiple small lymph nodes.



ADDITIONAL IMPRESSION:

5. Focal segment of small bowel wall thickening with associated prominent lymph

nodes and mild fat infiltration. Differential considerations include

lymphomatous involvement versus enteritis. Neoplastic involvement by lymphoma is

favored given the appearance and focality.



This finding was discussed by Dr. Hoffmann with Dr. Zhou on the left/14/2017.







Electronically signed by:  Gurmeet Llanes M.D.

11/14/2017 6:04 PM



Dictated Date/Time:  11/14/2017 6:02 PM



******** ORIGINAL REPORT ********





ABD/PELVIS IV AND ORAL CONT



CLINICAL HISTORY: 89 years-old Male presenting with LYMPHOMA. 



TECHNIQUE: Multidetector CT of the abdomen and pelvis was performed after the

administration of oral and intravenous contrast. IV contrast: 93 mL of Optiray

320. A dose lowering technique was used consistent with the principles of ALARA

(as low as reasonably achievable). 



COMPARISON: 9/14/2017.



CT DOSE (mGy.cm): The estimated cumulative dose is 692.20 mGy.cm.



FINDINGS:



 topogram: Unremarkable.



Lung bases: Minimal dependent changes likely atelectasis. Mosaic attenuation at

the lung bases suggest small airways disease. Calcified granuloma noted in the

left lower lobe. Multichamber enlargement of the heart. Aortic valve, mitral

annular, and coronary artery calcification. No pericardial or pleural effusion. 



Liver: Mild macronodular undersurface of the liver.



Biliary: No intrahepatic or extrahepatic biliary ductal dilatation. Gallbladder

contains gallstones.



Pancreas: Moderate parenchymal atrophy.



Spleen: Multiple parenchymal calcification suggest prior granulomatous

infection.



Adrenal glands: Normal.



Kidneys and ureters: Normal. No hydronephrosis.



Bladder: Incompletely evaluated secondary to underdistention. Allowing for

underdistention, circumferential bladder wall thickening is suggested. Focal

hypodensity now evident at the ureteral anastomosis where previously

hyperenhancement was seen. No nodular enhancement at the anastomosis.



Pelvic organs: Postsurgical changes of prostatectomy.



Bowel: Diverticulosis of the sigmoid and descending colon. Moderate stool

burden. No bowel obstruction.



Peritoneal cavity: No free fluid or intraperitoneal gas.



Lymph nodes: Few prominent mesenteric lymph nodes noted in the superior pelvis,

similar to prior exam although direct comparison is slightly difficult due to

the change in distribution from movement of small bowel. The largest lymph node

measures 9 mm in short axis (series 7 image 296). Scattered subcentimeter

retroperitoneal lymph nodes noted unchanged from prior exam.



Vasculature: Atherosclerosis of the normal caliber abdominal aorta. IVC patent.

Atherosclerosis narrows the origins of the major branch vessels.



Abdominal wall: Gynecomastia noted on the left. 



Musculoskeletal: Degenerative changes of the spine. Sclerosis in the lateral

right fifth through eighth ribs in a linear distribution likely prior fractures.

No destructive osseous lesion. Severe osteopenia.



IMPRESSION:

1.  Mildly prominent mesenteric lymph nodes, which are not pathologically

enlarged by CT size criteria. Attention on follow-up. No other evidence to

suggest lymphadenopathy in the abdomen or pelvis.



2.  Interval resolution of previously noted nodular hyperenhancement at the

ureteral anastomosis.



3.  Postsurgical changes of prostatectomy.



4.  Bladder wall thickening may be secondary to prior chronic outlet

obstruction.











Electronically signed by:  Gurmeet Llanes M.D.

11/13/2017 3:18 PM



Dictated Date/Time:  11/13/2017 3:11 PM

## 2017-11-13 NOTE — DIAGNOSTIC IMAGING REPORT
CHEST CT WITH CONTRAST



HISTORY: Follow-up study in a patient with lymphoma  LYMPHOMA



TECHNIQUE: Multiaxial CT images of the chest were performed following the

intravenous administration of contrast.  A dose lowering technique was utilized

adhering to the principles of ALARA.



COMPARISON:  CT chest 4/8/2015, PET CT 4/13/2015 and 3/9/2016.



FINDINGS: 

 No dominant thyroid nodule identified. There is no pathologic adenopathy

identified. Heart is moderately enlarged. Coronary arterial calcifications are

noted. Calcifications of the aortic annulus are also present. Moderate plaquing

at the origin of the left common carotid artery appears to cause approximately

50% narrowing. The opacified pulmonary arterial tree is unremarkable. There is

no pathologically enlarged adenopathy about the chest identified.



There is no pneumothorax or pleural effusion. Calcified glomus are noted. Mild

dependent bibasilar atelectasis. Linear subsegmental opacities of the lung bases

may reflect atelectasis or areas of pleural-parenchymal scarring. There are a

few pleural-based areas of nodular density within the lungs bilaterally. For

example there is a pleural-based 3 mm pulmonary nodule right middle lobe image

183 series 6 which is unchanged. 2 mm nodule of the right lung apex, image 28 of

series 6 also unchanged. No new or suspicious pulmonary nodules or masses. The

central airways are patent.



Cholelithiasis without CT evidence of acute cholecystitis. No acute abnormality

of the imaged upper abdomen. Soft tissues are unremarkable. There are

calcifications throughout the spleen compatible with prior granulomatous

disease. No suspicious lytic or blastic bony lesions. Multilevel endplate

degenerative changes of the spine.



IMPRESSION: 

1. No acute intrathoracic abnormality identified. No pathologic adenopathy.

2. Evidence of prior granulomatous disease. 

3. Cardiomegaly.







Electronically signed by:  Saroj Brown M.D.

11/13/2017 3:44 PM



Dictated Date/Time:  11/13/2017 3:37 PM

## 2017-11-22 ENCOUNTER — HOSPITAL ENCOUNTER (OUTPATIENT)
Dept: HOSPITAL 45 - C.MRIBC | Age: 82
Discharge: HOME | End: 2017-11-22
Attending: RADIOLOGY
Payer: COMMERCIAL

## 2017-11-22 DIAGNOSIS — C79.89: ICD-10-CM

## 2017-11-22 DIAGNOSIS — C61: Primary | ICD-10-CM

## 2017-11-22 DIAGNOSIS — C79.51: ICD-10-CM

## 2017-11-22 NOTE — DIAGNOSTIC IMAGING REPORT
PROSTATE MRI COMBO



CLINICAL HISTORY: 89 years-old Male presenting with PROSTATE CA.



TECHNIQUE: Multisequence, multiplanar MR imaging of the prostate was performed

before and after the administration of intravenous contrast. IV contrast: 7.5 cc

of Gadavist area



COMPARISON: Abdomen and pelvis CT 11/13/2017.



FINDINGS:



The patient is status post prostatectomy. Metallic artifact of the prostatectomy

bed results in suboptimal evaluation. There are no areas of abnormal enhancement

at the prostatectomy bed to suggest recurrent disease. Mild smooth enhancement

at the ureteral anastomosis. No nodular areas of enhancement identified. No soft

tissue masses identified within the prostatectomy bed. Trace pelvic free fluid.

Colonic diverticulosis. No pelvic lymphadenopathy. Fat-containing left inguinal

hernia. Axial T2 fat sat sequences demonstrate multiple hyperintense lesions

seen within the lower lumbar spine, pelvis/sacrum, and bilateral proximal femurs

consistent with metastatic disease. Dominant lesion within the left side of the

sacrum measures 2.6 cm. There is a focal segment of thickened small bowel seen

within the mid lower abdomen. There is mild surrounding edema.







IMPRESSION:





1. No evidence for recurrent/residual disease within the prostatectomy bed.

2. Multiple metastatic lesions seen throughout the visualized osseous structures

of the pelvis, sacrum, and bilateral femurs.

3. Redemonstration of the focal thickened loop of small bowel within the

midabdomen. This may correspond to the patient's history of lymphoma.







Electronically signed by:  Geremias Schmidt M.D.

11/22/2017 12:54 PM



Dictated Date/Time:  11/22/2017 12:33 PM

## 2017-12-13 ENCOUNTER — HOSPITAL ENCOUNTER (OUTPATIENT)
Dept: HOSPITAL 45 - C.PET | Age: 82
Discharge: HOME | End: 2017-12-13
Attending: INTERNAL MEDICINE
Payer: COMMERCIAL

## 2017-12-13 DIAGNOSIS — R93.7: ICD-10-CM

## 2017-12-13 DIAGNOSIS — K63.89: ICD-10-CM

## 2017-12-13 DIAGNOSIS — I88.0: ICD-10-CM

## 2017-12-13 DIAGNOSIS — K80.20: ICD-10-CM

## 2017-12-13 DIAGNOSIS — C79.51: Primary | ICD-10-CM

## 2017-12-13 NOTE — DIAGNOSTIC IMAGING REPORT
PET/CT



CLINICAL HISTORY: Unspecified neoplasm of bone.



COMPARISON STUDY: CT scan of the chest, abdomen, and pelvis dated 11/13/2017.

Nuclear bone scan dated 10/2/2017. PET/CT dated 3/9/2016. Prostate MRI dated

11/22/2017.



TECHNIQUE: One hour following the IV administration of 10.27 mCi of F-18 NaF,

whole body PET/CT examination was performed from the vertex to the feet.

Noncontrast CT is performed for the purposes of anatomic correlation and

attenuation correction. Note that this does not reflect a diagnostic CT

examination. Images were reviewed on a separate [employer] independent workstation.

Fused images were obtained. Standard uptake values reported are maximum values

within the region of interest expressed in gm/mL.



FINDINGS:



PET FINDINGS:



NaF skeletal findings: There is diffusely heterogeneous marrow activity. No

focal lesion is clearly identified. This may represent diffuse disease when

correlated with the pelvic MRI but is indeterminant. Disease was much better

seen by MRI. Typically degenerative activity seen throughout the spine. Activity

is identified within numerous nonacute right-sided rib fractures. Arthritic

activity is seen in the ankle joints.



Unenhanced CT images: The brain parenchyma is normal as visualized noting

age-related involutional change. The bony orbits are grossly intact. There are

bilateral ocular lens implants. There is mild to moderate mucosal thickening

within the left maxillary antrum. Trace mucosal thickening is seen in the right

maxillary antrum. The remaining paranasal sinuses are clear. The mastoid air

cells are well pneumatized. The salivary and thyroid glands are normal as

visualized. No cervical lymphadenopathy is seen. There is atherosclerotic

calcification of the carotid bulbs. There is moderate atherosclerotic

calcification of the thoracic aorta which is normal in caliber. The heart is

enlarged and without pericardial effusion. The coronary arteries are densely

calcified. There is no mediastinal, hilar, or axillary lymphadenopathy. No

airspace consolidation or pleural effusion is seen. There is bibasilar scarring

versus atelectasis. Scattered calcified granulomas are identified. A 3 mm

pleural-based nodule is noted in the right middle lobe on image #132. The

unenhanced liver is grossly unremarkable. There are numerous calcified

gallstones. The unenhanced pancreas is atrophic. The spleen is normal in size

and there are numerous calcified splenic granulomas. The kidneys are atrophic

and without hydronephrosis. The adrenal glands are grossly unremarkable. The

abdominal aorta is normal in caliber noting advanced atherosclerotic

calcification. No bowel obstruction is seen. There is a thick walled loop of

small bowel in the pelvis seen on image #222. There are enlarged mesenteric

lymph nodes surrounding this abnormal loop. The largest is seen on image #212

and measures 4.0 x 2.2 cm. There is a small duodenal diverticulum. There is

advanced sigmoid diverticulosis without CT evidence of acute diverticulitis. The

bladder is normal as visualized. The prostate gland is surgically absent. There

is no retroperitoneal, pelvic sidewall, or inguinal lymphadenopathy. The

skeletal structures are osteopenic. Multilevel degenerative change is present

throughout the spine. There is a compression deformity of L2. There are numerous

nonacute right-sided rib fractures. The lower extremity soft tissues are normal

as imaged.





IMPRESSION:



1. There is diffusely heterogeneous/patchy marrow activity, greatest throughout

the spine and involving the bony pelvis. No focal lesion is clearly identified.

This is nonspecific and likely represents diffuse osseous metastatic disease

when correlated with the recent pelvic MRI. These lesions were much better seen

by MRI.



2. Again seen is a markedly thick-walled loop of distal ileum in the pelvis.

There is increasing surrounding mesenteric lymphadenopathy, and the appearance

is highly concerning for lymphoma. Ileitis is considered much less likely due to

the time course and progressive adenopathy.



3. Cholelithiasis.



4. Additional findings as above.







Electronically signed by:  Howard Stratton M.D.

12/13/2017 1:59 PM



Dictated Date/Time:  12/13/2017 1:28 PM

## 2017-12-27 ENCOUNTER — HOSPITAL ENCOUNTER (INPATIENT)
Dept: HOSPITAL 45 - C.EDB | Age: 82
LOS: 1 days | Discharge: HOME | DRG: 309 | End: 2017-12-28
Attending: FAMILY MEDICINE | Admitting: HOSPITALIST
Payer: COMMERCIAL

## 2017-12-27 VITALS
HEIGHT: 69 IN | BODY MASS INDEX: 25.73 KG/M2 | WEIGHT: 173.72 LBS | HEIGHT: 69 IN | BODY MASS INDEX: 25.73 KG/M2 | WEIGHT: 173.72 LBS

## 2017-12-27 DIAGNOSIS — I25.10: ICD-10-CM

## 2017-12-27 DIAGNOSIS — I50.22: ICD-10-CM

## 2017-12-27 DIAGNOSIS — I48.0: Primary | ICD-10-CM

## 2017-12-27 DIAGNOSIS — Z82.49: ICD-10-CM

## 2017-12-27 DIAGNOSIS — Z90.79: ICD-10-CM

## 2017-12-27 DIAGNOSIS — R10.13: ICD-10-CM

## 2017-12-27 DIAGNOSIS — Z86.718: ICD-10-CM

## 2017-12-27 DIAGNOSIS — Z85.72: ICD-10-CM

## 2017-12-27 DIAGNOSIS — Z98.890: ICD-10-CM

## 2017-12-27 DIAGNOSIS — Z79.899: ICD-10-CM

## 2017-12-27 DIAGNOSIS — R07.2: ICD-10-CM

## 2017-12-27 DIAGNOSIS — R94.31: ICD-10-CM

## 2017-12-27 DIAGNOSIS — Z83.3: ICD-10-CM

## 2017-12-27 DIAGNOSIS — Z83.6: ICD-10-CM

## 2017-12-27 DIAGNOSIS — Z79.01: ICD-10-CM

## 2017-12-27 DIAGNOSIS — I42.9: ICD-10-CM

## 2017-12-27 DIAGNOSIS — Z85.46: ICD-10-CM

## 2017-12-27 DIAGNOSIS — I87.2: ICD-10-CM

## 2017-12-27 LAB
ALBUMIN SERPL-MCNC: 3.1 GM/DL (ref 3.4–5)
ALP SERPL-CCNC: 175 U/L (ref 45–117)
ALT SERPL-CCNC: 21 U/L (ref 12–78)
AST SERPL-CCNC: 35 U/L (ref 15–37)
BASOPHILS # BLD: 0.04 K/UL (ref 0–0.2)
BASOPHILS NFR BLD: 0.7 %
BUN SERPL-MCNC: 17 MG/DL (ref 7–18)
CALCIUM SERPL-MCNC: 8.6 MG/DL (ref 8.5–10.1)
CK MB SERPL-MCNC: 2.3 NG/ML (ref 0.5–3.6)
CO2 SERPL-SCNC: 26 MMOL/L (ref 21–32)
CREAT SERPL-MCNC: 1.17 MG/DL (ref 0.6–1.4)
EOS ABS #: 0.16 K/UL (ref 0–0.5)
EOSINOPHIL NFR BLD AUTO: 169 K/UL (ref 130–400)
GLUCOSE SERPL-MCNC: 150 MG/DL (ref 70–99)
HCT VFR BLD CALC: 38.3 % (ref 42–52)
HGB BLD-MCNC: 12.9 G/DL (ref 14–18)
IG#: 0.03 K/UL (ref 0–0.02)
IMM GRANULOCYTES NFR BLD AUTO: 21.9 %
INR PPP: 2.4 (ref 0.9–1.1)
KETONES UR QL STRIP: 108 MG/DL
LYMPHOCYTES # BLD: 1.34 K/UL (ref 1.2–3.4)
MCH RBC QN AUTO: 31.2 PG (ref 25–34)
MCHC RBC AUTO-ENTMCNC: 33.7 G/DL (ref 32–36)
MCV RBC AUTO: 92.5 FL (ref 80–100)
MONO ABS #: 0.62 K/UL (ref 0.11–0.59)
MONOCYTES NFR BLD: 10.1 %
NEUT ABS #: 3.92 K/UL (ref 1.4–6.5)
NEUTROPHILS # BLD AUTO: 2.6 %
NEUTROPHILS NFR BLD AUTO: 64.2 %
PH UR: 164 MG/DL (ref 0–200)
PMV BLD AUTO: 10.9 FL (ref 7.4–10.4)
POTASSIUM SERPL-SCNC: 3.8 MMOL/L (ref 3.5–5.1)
PROT SERPL-MCNC: 6.6 GM/DL (ref 6.4–8.2)
PTT PATIENT: 41.3 SECONDS (ref 21–31)
RED CELL DISTRIBUTION WIDTH CV: 13.8 % (ref 11.5–14.5)
RED CELL DISTRIBUTION WIDTH SD: 47 FL (ref 36.4–46.3)
SODIUM SERPL-SCNC: 139 MMOL/L (ref 136–145)
WBC # BLD AUTO: 6.11 K/UL (ref 4.8–10.8)

## 2017-12-27 NOTE — DIAGNOSTIC IMAGING REPORT
SINGLE VIEW CHEST



CLINICAL HISTORY:  Generalized weakness.



FINDINGS: An AP, portable, upright chest radiograph is compared to study dated

8/11/2017 and correlated with chest CT dated 11/13/2017. The examination is

degraded by portable technique and apical lordotic positioning.  The heart is

enlarged and there is atherosclerotic calcification of the thoracic aorta. The

pulmonary vasculature is noncongested. Chronic interstitial thickening is

similar to previous. Atelectasis is seen at the left lung base. No airspace

consolidation, large pleural effusion, or pneumothorax is identified. The

skeletal structures are osteopenic. There are healed right-sided rib fractures.



IMPRESSION: Cardiomegaly with no acute cardiopulmonary abnormality.







Electronically signed by:  Howard Stratton M.D.

12/27/2017 7:16 PM



Dictated Date/Time:  12/27/2017 7:15 PM

## 2017-12-27 NOTE — HISTORY AND PHYSICAL
History & Physical


Date & Time of Service:


Dec 27, 2017 at 23:04


Chief Complaint:


A-Fib, Substernal Discomfort


Primary Care Physician:


Oliver Chen M.D.


History of Present Illness


Source:  patient, family, hospital records


The patient is an 89-year-old male presents to the emergency department with 

epigastric area chest discomfort that began early this morning upon awakening.  

He has noted some increased gassiness as well.  Nothing makes the discomfort 

better or worse.  He is on warfarin for atrial fibrillation, and has a history 

of CHF, but does not report any weight gain.  He reports a recent urinary 

bladder procedure due to enlarged prostate, and reports difficulty with 

urination.





Past Medical/Surgical History


Medical Problems:


(1) Atrial fibrillation


Status: Chronic  





(2) CHF (congestive heart failure)


Status: Chronic  





(3) DVT (deep venous thrombosis)


Status: Chronic  





(4) H/O blood clots


Status: Chronic  





(5) Heart disease


Status: Chronic  





(6) Lyme disease


Status: Chronic  





(7) Mantle cell lymphoma


Status: Chronic  





(8) Non-Hodgkin lymphoma


Status: Chronic  





(9) Prostate cancer


Status: Chronic  





(10) Shingles


Status: Resolved  





Surgical Problems:


(1) H/O prostatectomy


Status: Resolved  





(2) S/P appendectomy


Status: Resolved  











Family History





Diabetes mellitus


FH: cancer


FH: heart disease


FH: lung disease


Hypertension





Social History


Smoking Status:  Never Smoker


Smokeless Tobacco Use:  No


Alcohol Use:  none


Drug Use:  none


Marital Status:  


Housing status:  lives with family


Occupational Status:  unemployed





Immunizations


History of Influenza Vaccine:  Unknown


History of Tetanus Vaccine?:  utd


History of Pneumococcal:  Unknown


History of Hepatitis B Vaccine:  No





Multi-Drug Resistant Organisms


History of MDRO:  No





Allergies


Coded Allergies:  


     Acetazolamide (Verified  Allergy, Unknown, Diamox Eye Drops ? rxn, 12/27/17

)





Home Medications


Scheduled


Bicalutamide (Casodex), 50 MG PO QAM


Calcium Carbonate-Vitamin D (Oyster Shell Calcium/D3 500-400 mg-Unit), 2 TABS 

PO DAILY


Gabapentin (Neurontin), 300 MG PO BID


Leuprolide Acetate (Lupron Depot), 30 MG IM Q4MO


Multivitamin (Multivitamin), 1 TAB PO QAM


Omega-3 Fatty Acids (Fish Oil 1200 mg), 1,200 MG PO DAILY


Warfarin Sodium (Coumadin), 1 TAB PO 6XWK


Warfarin Sodium (Warfarin Sodium), 7.5 MG PO WK





Scheduled PRN


Furosemide (Furosemide), 20-40 MG PO DAILY PRN for "DEPENDS ON WT GAIN".





Review of Systems


The patient denies palpitations, cough, lower extremity swelling, vision change

, hearing change, 


sore throat, fevers, chills, sweats, weight change, nausea, vomiting, diarrhea 

or constipation, abdominal pain, pelvic pain, 


blood in stool,  lightheadedness, dizziness, headache, memory loss, loss of 

consciousness, rash, abnormal bruising or bleeding,


imbalance, focal weakness, numbness or tingling in arms or legs, generalized 

arthralgias or myalgias, back or neck pain, or night sweats.


The review of systems is otherwise negative other than for that already noted 

above, and at least 10 systems have been reviewed.





Physical Exam


Vital Signs











  Date Time  Temp Pulse Resp B/P (MAP) Pulse Ox O2 Delivery O2 Flow Rate FiO2


 


12/27/17 22:40  88 18 148/101 98   


 


12/27/17 21:27  86 18 170/108 98 Room Air  


 


12/27/17 20:00  86 18 151/86 95 Room Air  


 


12/27/17 19:42     95 Room Air  


 


12/27/17 19:42     95 Room Air  


 


12/27/17 19:12  93      


 


12/27/17 18:42 36.7 112 18 117/71 98 Room Air  








The patient is awake, well-developed and adequately nourished, alert and 

oriented 3, normocephalic and atraumatic, lying in bed and in no acute 

distress.


HEENT--PERRL, EOMI, mucous membranes  and oropharynx dry.


Neck--supple, no JVD or bruits, thyroid normal, trachea midline, no adenopathy.


Heart--normal S1 and S2, no extra beats, no murmurs, rubs or gallops.


Lungs--clear bilaterally, no respiratory distress, no accessory muscle use.


Abdomen--normal bowel sounds and soft, nontender and nondistended, no hernias 

or masses,  no organomegaly.


Extremities--no cyanosis, clubbing or edema. There are good distal pulses b/l.


Dermatologic--normal skin turgor, normal color, warm and dry, no abnormal lymph 

nodes, no rash.


Neurologic--cranial nerves II through XII grossly intact.


Rheumatologic--normal range of motion.


Psychiatric--normal affect.





Diagnostics


Laboratory Results





Results Past 24 Hours








Test


  12/27/17


19:14 Range/Units


 


 


White Blood Count 6.11 4.8-10.8  K/uL


 


Red Blood Count 4.14 4.7-6.1  M/uL


 


Hemoglobin 12.9 14.0-18.0  g/dL


 


Hematocrit 38.3 42-52  %


 


Mean Corpuscular Volume 92.5   fL


 


Mean Corpuscular Hemoglobin 31.2 25-34  pg


 


Mean Corpuscular Hemoglobin


Concent 33.7


  32-36  g/dl


 


 


Platelet Count 169 130-400  K/uL


 


Mean Platelet Volume 10.9 7.4-10.4  fL


 


Neutrophils (%) (Auto) 64.2  %


 


Lymphocytes (%) (Auto) 21.9  %


 


Monocytes (%) (Auto) 10.1  %


 


Eosinophils (%) (Auto) 2.6  %


 


Basophils (%) (Auto) 0.7  %


 


Neutrophils # (Auto) 3.92 1.4-6.5  K/uL


 


Lymphocytes # (Auto) 1.34 1.2-3.4  K/uL


 


Monocytes # (Auto) 0.62 0.11-0.59  K/uL


 


Eosinophils # (Auto) 0.16 0-0.5  K/uL


 


Basophils # (Auto) 0.04 0-0.2  K/uL


 


RDW Standard Deviation 47.0 36.4-46.3  fL


 


RDW Coefficient of Variation 13.8 11.5-14.5  %


 


Immature Granulocyte % (Auto) 0.5  %


 


Immature Granulocyte # (Auto) 0.03 0.00-0.02  K/uL


 


Prothrombin Time


  25.2


  9.0-12.0


SECONDS


 


Prothromb Time International


Ratio 2.4


  0.9-1.1  


 


 


Activated Partial


Thromboplast Time 41.3


  21.0-31.0


SECONDS


 


Partial Thromboplastin Ratio 1.6  


 


Sodium Level 139 136-145  mmol/L


 


Potassium Level 3.8 3.5-5.1  mmol/L


 


Chloride Level 103   mmol/L


 


Carbon Dioxide Level 26 21-32  mmol/L


 


Anion Gap 10.0 3-11  mmol/L


 


Blood Urea Nitrogen 17 7-18  mg/dl


 


Creatinine


  1.17


  0.60-1.40


mg/dl


 


Est Creatinine Clear Calc


Drug Dose 42.8


   ml/min


 


 


Estimated GFR (


American) 63.7


   


 


 


Estimated GFR (Non-


American 54.9


   


 


 


BUN/Creatinine Ratio 14.9 10-20  


 


Random Glucose 150 70-99  mg/dl


 


Calcium Level 8.6 8.5-10.1  mg/dl


 


Magnesium Level 2.0 1.8-2.4  mg/dl


 


Total Bilirubin 0.6 0.2-1  mg/dl


 


Direct Bilirubin 0.2 0-0.2  mg/dl


 


Aspartate Amino Transf


(AST/SGOT) 35


  15-37  U/L


 


 


Alanine Aminotransferase


(ALT/SGPT) 21


  12-78  U/L


 


 


Alkaline Phosphatase 175   U/L


 


Total Creatine Kinase 87   U/L


 


Creatine Kinase MB 2.3 0.5-3.6  ng/ml


 


Creatine Kinase MB Ratio 2.6 0-3.0  


 


Troponin I < 0.015 0-0.045  ng/ml


 


Total Protein 6.6 6.4-8.2  gm/dl


 


Albumin 3.1 3.4-5.0  gm/dl


 


Thyroid Stimulating Hormone


(TSH) 1.350


  0.300-4.500


uIu/ml











Diagnostic Radiology





                                                                               

                                                                 


Patient Name: MAGGY ARIAS                               Unit Number:  

[id number]                  


 








 











Dictated: 12/27/17 1915


 


Transcribed: 12/27/17 1915


 


EV


 


Printed Date/Time: [~ rep prt dt]/[~ rep prt tm]








 [~ rep ct labl] - [~ rep ct ivnm]


 





   [facility]


 Radiology Department


 [place] [address]


 [phone]





 











Dictated: 12/27/17 1915


 


Transcribed: 12/27/17 1915


 


EV


 


Printed Date/Time: [~ rep prt dt]/[~ rep prt tm]








 [~ rep ct labl] - [~ rep ct ivnm]


 








SINGLE VIEW CHEST





CLINICAL HISTORY:  Generalized weakness.





FINDINGS: An AP, portable, upright chest radiograph is compared to study dated


8/11/2017 and correlated with chest CT dated 11/13/2017. The examination is


degraded by portable technique and apical lordotic positioning.  The heart is


enlarged and there is atherosclerotic calcification of the thoracic aorta. The


pulmonary vasculature is noncongested. Chronic interstitial thickening is


similar to previous. Atelectasis is seen at the left lung base. No airspace


consolidation, large pleural effusion, or pneumothorax is identified. The


skeletal structures are osteopenic. There are healed right-sided rib fractures.





IMPRESSION: Cardiomegaly with no acute cardiopulmonary abnormality.











Electronically signed by:  Howard Stratton M.D.


12/27/2017 7:16 PM





Dictated Date/Time:  12/27/2017 7:15 PM





 The status of this report is Signed.   


 Draft = Not yet reviewed or approved by Radiologist.  


 Signed = Reviewed and approved by Radiologist.


<AttendingPhy></AttendingPhy> <FamilyPhy>Masood Mark M.D.</FamilyPhy> <

PrimaryPhy>Oliver Chen M.D.</PrimaryPhy> <UnitNumber>[id number]</UnitNumber> <

VisitNumber>[id number]</VisitNumber> <PatientName>MAGGY ARIAS</PatientName

> <DateOfBirth>1928</DateOfBirth> <Location>[facility]</Location> <ServiceDate>

12/27/17</ServiceDate> <MNE>ESINDI</MNE> <OrderingPhy>Oliver Gonzalez MD</

OrderingPhy> <OrderingPhyMNE>f rep ord dr kincaid</OrderingPhyMNE> <DictatingPhyMNE>

f rep dict dr kincaid</DictatingPhyMNE> <CCListMNE>f rep ct sanjiv</CCListMNE> <

AdmittingPhyMNE>f pt admit dr kincaid</AdmittingPhyMNE> <AttendingPhyMNE>f pt 

attend dr kincaid</AttendingPhyMNE>


<ConsultingPhyMNE>f pt consult dr kincaid</ConsultingPhyMNE> <FamilyPhyMNE>f pt fam 

dr kincaid</FamilyPhyMNE> <OtherPhyMNE>f pt other dr kincaid</OtherPhyMNE> <

PrimaryPhyMNE>f pt prim care dr kincaid</PrimaryPhyMNE> <ReferringPhyMNE>f pt 

referring dr kincaid</ReferringPhyMNE>





EKG


EKG shows atrial fibrillation at 100 bpm, aberrantly conducted PVCs, lateral 

ischemia, no change compared to 08/11/2017.





Impression


Assessment and Plan


Chronic atrial fibrillation/CAD/persistent lateral ischemia on EKG/epigastric 

and precordial chest discomfort--


The patient will be admitted to telemetry for serial cardiac enzymes, cardiac 

rhythm monitoring and a 2-D echocardiogram with Dopplers.


Continue warfarin for now, presently therapeutic with INR 2.4.


Started on aspirin 81 mg daily to be continued


Nitropaste when his anterior chest wall every 6 hours


Add metoprolol tartrate 25 mg twice a day, with first dose now





History of DVT--


Continue warfarin and follow serial INRs





Mantle cell lymphoma/non-Hodgkin's lymphoma/prostate cancer--


Continue Casodex 50 mg by mouth every morning


Receives Lupron Depot 30 mg IM every 4 months


Hold off on tamsulosin addition until nitroglycerin is determined.





Level of Care


Telemetry





Advanced Directives


Existing Advance Directive:  No


Existing Living Will:  No


Existing Power of :  No





Resuscitation Status


FULL RESUSCITATION





VTE Prophylaxis


VTE Risk Assessment Done? Y/N:  Yes


Risk Level:  Moderate


Given or contraindicated:  Warfarin (Coumadin)

## 2017-12-27 NOTE — NUR
A: Patient arrived to room N289-1 from emergency room via wheelchair. Ambulated with 
supervision assist with can to bathroom and then to bed. A&O x 4. VSS on room air. Afib on 
monitor. HR 82. Patient states he has chest substernal chest pressure rated 3/10 with no 
accompanying symptoms. Nitro ointment intact to left upper chest. Patient oriented to room 
and call bell. Instructed to ring for assistance oob. IVF stated, infusing @ 50mL/hr into RT 
hand. Call bell within reach. Will continue to monitor.

## 2017-12-28 VITALS
HEART RATE: 76 BPM | DIASTOLIC BLOOD PRESSURE: 67 MMHG | TEMPERATURE: 98.24 F | OXYGEN SATURATION: 96 % | SYSTOLIC BLOOD PRESSURE: 113 MMHG

## 2017-12-28 VITALS
OXYGEN SATURATION: 94 % | DIASTOLIC BLOOD PRESSURE: 78 MMHG | SYSTOLIC BLOOD PRESSURE: 150 MMHG | TEMPERATURE: 98.06 F | HEART RATE: 88 BPM

## 2017-12-28 VITALS
SYSTOLIC BLOOD PRESSURE: 113 MMHG | DIASTOLIC BLOOD PRESSURE: 67 MMHG | TEMPERATURE: 98.24 F | HEART RATE: 76 BPM | OXYGEN SATURATION: 96 %

## 2017-12-28 VITALS
TEMPERATURE: 97.88 F | DIASTOLIC BLOOD PRESSURE: 72 MMHG | SYSTOLIC BLOOD PRESSURE: 144 MMHG | OXYGEN SATURATION: 98 % | HEART RATE: 81 BPM

## 2017-12-28 VITALS — SYSTOLIC BLOOD PRESSURE: 157 MMHG | DIASTOLIC BLOOD PRESSURE: 79 MMHG

## 2017-12-28 VITALS — TEMPERATURE: 97.88 F | SYSTOLIC BLOOD PRESSURE: 134 MMHG | DIASTOLIC BLOOD PRESSURE: 79 MMHG | HEART RATE: 74 BPM

## 2017-12-28 VITALS
DIASTOLIC BLOOD PRESSURE: 84 MMHG | SYSTOLIC BLOOD PRESSURE: 150 MMHG | OXYGEN SATURATION: 96 % | HEART RATE: 60 BPM | TEMPERATURE: 98.24 F

## 2017-12-28 VITALS — OXYGEN SATURATION: 98 %

## 2017-12-28 LAB
BASOPHILS # BLD: 0.05 K/UL (ref 0–0.2)
BASOPHILS NFR BLD: 0.9 %
BUN SERPL-MCNC: 16 MG/DL (ref 7–18)
CALCIUM SERPL-MCNC: 8.4 MG/DL (ref 8.5–10.1)
CK MB SERPL-MCNC: 1.7 NG/ML (ref 0.5–3.6)
CK MB SERPL-MCNC: 1.8 NG/ML (ref 0.5–3.6)
CO2 SERPL-SCNC: 27 MMOL/L (ref 21–32)
CREAT SERPL-MCNC: 1.01 MG/DL (ref 0.6–1.4)
EOS ABS #: 0.24 K/UL (ref 0–0.5)
EOSINOPHIL NFR BLD AUTO: 158 K/UL (ref 130–400)
GLUCOSE SERPL-MCNC: 96 MG/DL (ref 70–99)
HBA1C MFR BLD: 5.7 % (ref 4.5–5.6)
HCT VFR BLD CALC: 38.2 % (ref 42–52)
HGB BLD-MCNC: 12.7 G/DL (ref 14–18)
IG#: 0.04 K/UL (ref 0–0.02)
IMM GRANULOCYTES NFR BLD AUTO: 26.1 %
INR PPP: 2.4 (ref 0.9–1.1)
LYMPHOCYTES # BLD: 1.42 K/UL (ref 1.2–3.4)
MCH RBC QN AUTO: 30.6 PG (ref 25–34)
MCHC RBC AUTO-ENTMCNC: 33.2 G/DL (ref 32–36)
MCV RBC AUTO: 92 FL (ref 80–100)
MONO ABS #: 0.61 K/UL (ref 0.11–0.59)
MONOCYTES NFR BLD: 11.2 %
NEUT ABS #: 3.08 K/UL (ref 1.4–6.5)
NEUTROPHILS # BLD AUTO: 4.4 %
NEUTROPHILS NFR BLD AUTO: 56.7 %
PMV BLD AUTO: 10.9 FL (ref 7.4–10.4)
POTASSIUM SERPL-SCNC: 3.9 MMOL/L (ref 3.5–5.1)
PTT PATIENT: 40.9 SECONDS (ref 21–31)
RED CELL DISTRIBUTION WIDTH CV: 13.8 % (ref 11.5–14.5)
RED CELL DISTRIBUTION WIDTH SD: 46.2 FL (ref 36.4–46.3)
SODIUM SERPL-SCNC: 138 MMOL/L (ref 136–145)
WBC # BLD AUTO: 5.44 K/UL (ref 4.8–10.8)

## 2017-12-28 RX ADMIN — INSULIN ASPART SCH UNITS: 100 INJECTION, SOLUTION INTRAVENOUS; SUBCUTANEOUS at 09:08

## 2017-12-28 RX ADMIN — INSULIN ASPART SCH UNITS: 100 INJECTION, SOLUTION INTRAVENOUS; SUBCUTANEOUS at 12:11

## 2017-12-28 RX ADMIN — NITROGLYCERIN SCH INCH: 20 OINTMENT TOPICAL at 05:50

## 2017-12-28 RX ADMIN — INSULIN ASPART SCH UNITS: 100 INJECTION, SOLUTION INTRAVENOUS; SUBCUTANEOUS at 16:30

## 2017-12-28 RX ADMIN — NITROGLYCERIN SCH INCH: 20 OINTMENT TOPICAL at 11:38

## 2017-12-28 RX ADMIN — NITROGLYCERIN SCH INCH: 20 OINTMENT TOPICAL at 00:16

## 2017-12-28 NOTE — NUR
Discharge planning consult received.  I spoke with the patient at bedside, he is alert and 
oriented x 4.  He states that he resides in independent living at the [facility].  He 
currently uses a walker and cane as needed.  He is able to provide his own lunch and 
breakfast and has dinner in the dining room.  His plan is to return to his home at 
discharge.  Patient would benefit from pt/ot evals, case management will follow. 

-------------------------------------------------------------------------------

Addendum: 12/28/17 at 1555 by Clover Quan SERV

-------------------------------------------------------------------------------

I spoke again with patient and his daughter regarding discharge options.  They are 
requesting a referral be made to the [facility], referral made.  Patient understands that this 
will have to be approved through insurance and will need to participate in pt/ot.  

-------------------------------------------------------------------------------

Addendum: 12/28/17 at 1621 by Cloverdemetrio Quan SERV

-------------------------------------------------------------------------------

Per Dr Kemp, patient is stable for discharge.  She feels he can return home to independent 
living.  I called and left a message for Iva at the [facility] with update.

## 2017-12-28 NOTE — DISCHARGE INSTRUCTIONS
Discharge Instructions


Date of Service


Dec 28, 2017.





Admission


Reason for Admission:  A-Fib, Substernal Discomfort





Discharge


Discharge Diagnosis / Problem:  Chest pain-noncardiac, Rapid atrial fibrillation





Discharge Goals


Goal(s):  Improve disease control, Diagnostic testing, Therapeutic intervention





Activity Recommendations


Activity Limitations:  resume your previous activity


Shower/Bathe:  no limitations





.





Instructions / Follow-Up


Instructions / Follow-Up


You were admitted with chest and upper abdominal pain. You were found to not 

have a heart attack based on blood tests, and ultrasound of the heart, and your 

ECGs.


Your heart function is actually improved somewhat since your previous 

Echocardiogram. You were started on a low dose of metoprolol twice a day to 

help bring down your fast heart rate. Cardiology saw you here and did not think 

any further testing was necessary.


This is most likely some upset stomach or perhaps mild inflammation of the 

stomach. You were given Maalox and had some relief. You can take Zantac 150mg 

twice daily for 1-2 weeks.


Please follow up with your PCP within 1-2 weeks.





Current Hospital Diet


Patient's current hospital diet: AHA Diet (Heart Healthy)





Discharge Diet


Recommended Diet:  AHA Diet (Heart Healthy)





Procedures


Procedures Performed:  


Chest xray


Echocardiogram





Pending Studies


Studies pending at discharge:  no





Laboratory Results





Hemoglobin A1c








Test


  12/27/17


19:14 Range/Units


 


 


Estimated Average Glucose 117   mg/dl


 


Hemoglobin A1c 5.7 H 4.5-5.6  %








Lipid Panel








Test


  12/27/17


19:14 Range/Units


 


 


Triglycerides Level 85  0-150  mg/dl


 


Cholesterol Level 164  0-200  mg/dl


 


HDL Cholesterol 39   mg/dl


 


Cholesterol/HDL Ratio 4.2   


 


LDL Cholesterol, Calculated 108   mg/dl











Medical Emergencies








.


Who to Call and When:





Medical Emergencies:  If at any time you feel your situation is an emergency, 

please call 911 immediately.





.





Non-Emergent Contact


Non-Emergency issues call your:  Primary Care Provider, Cardiologist


Call Non-Emergent contact if:  your pain is not controlled, your pain is 

worsening, your pain is unusual for you, your pain is concerning you, you have 

any medication questions





.


.








"Provider Documentation" section prepared by Aurora Kemp.








.





VTE Core Measure


Inpt VTE Proph given/why not?:  Warfarin (Coumadin)

## 2017-12-28 NOTE — DISCHARGE SUMMARY
Discharge Summary


Date of Service


Dec 28, 2017.





Discharge Summary


Admission Date:


Dec 27, 2017 at 22:01


Discharge Date:  Dec 28, 2017


Discharge Disposition:  Home


Principal Diagnosis:  Chest pain-noncardiac, Rapid atrial fibrillation


Problems/Secondary Diagnoses:


Dyspepsia


Chronic systolic CHF


Mild-moderate AI


Mild MR


Mild Pulm HTN


H/o DVT


Long term anticoagulation


H/o metastatic prostate CA


H/o Mantle cell lymphoma-in remission


Immunizations:  


   Have You Had Influenza Vaccine:  Unknown


   History of Tetanus Vaccine?:  utd


   History of Pneumococcal:  Unknown


   History of Hepatitis B Vaccine:  No


Procedures:


ECHO


CXR


Consultations:


Cardiology





Medication Reconciliation


New Medications:  


Aspirin (Aspirin EC Low Dose) 81 Mg Ectab


81 MG PO QAM for 30 Days





Metoprolol Tartrate (Lopressor) 25 Mg Tab


12.5 MG PO BID for 30 Days, #30 TAB





Ranitidine HCl (Ranitidine HCl) 150 Mg Tab


150 MG PO BID, #30 TAB





 


Continued Medications:  


Bicalutamide (Casodex) 50 Mg Tab


50 MG PO QAM, TAB





Calcium Carbonate-Vitamin D (Oyster Shell Calcium/D3 500-400 mg-Unit) 1 Tab Tab


2 TABS PO DAILY





Furosemide (Furosemide) 20 Mg Tab


20-40 MG PO DAILY PRN for "DEPENDS ON WT GAIN"., #30





Gabapentin (Neurontin) 300 Mg Cap


300 MG PO BID, CAP





Leuprolide Acetate (Lupron Depot) 30 Mg Kit


30 MG IM Q4MO





Multivitamin (Multivitamin)  Tab


1 TAB PO QAM, TAB





Omega-3 Fatty Acids (Fish Oil 1200 mg) 1 Cap Cap


1200 MG PO DAILY





Warfarin Sodium (Coumadin) 5 Mg Tab


1 TAB PO 6XWK for 90 Days, TAB 1 Refill


EVERY DAY EXCEPT MONDAYS.


Warfarin Sodium (Warfarin Sodium) 5 Mg Tab


7.5 MG PO WK for 90 Days, TAB 3 Refills


TAKES ON MONDAYS ONLY








Discharge Exam


Pt has not had any pain since he came to ER and received nitroglycerin and ASA. 

He describes substernal to epigastric pain that he noticed when he woke up the 

day of admission, nonradiating, felt gassy, no associated SOB or diaphoresis. 

Lasted for all day long until he came to the ER, but upon further questioning 

reports he has had some similar symptoms each night for the past week. Denies 

any changes in bowel habits, no N/V, no GI bleeding. Feeling well otherwise, 

not lightheaded, not weak.


After I saw him, he started complaining again of epigastric discomfort. This 

was relieved within 1 hour after administration of Maalox and po Zantac. He 

ambulated the halls and had no symptoms at all after eating dinner.


Review of Systems:  


   Constitutional:  No problem reported


   Eyes:  No problem reported


   ENT:  No problem reported


   Respiratory:  No problem reported


   Cardiovascular:  No problem reported


   Abdomen:  No problem reported


   Musculoskeletal:  No problem reported


   Genitourinary - Male:  No problem reported


   Neurologic:  No problem reported


   Psychiatric:  No problem reported


   Endocrine:  No problem reported


   Hematologic / Lymphatic:  No problem reported


   Integumentary:  No problem reported


Physical Exam:  


   General Appearance:  WD/WN, no apparent distress


   Eyes:  normal inspection, sclerae normal


   ENT:  hearing grossly normal


   Neck:  trachea midline


   Respiratory/Chest:  lungs clear, normal breath sounds, no respiratory 

distress, no accessory muscle use


   Cardiovascular:  no edema, no gallop, no murmur, normal peripheral pulses, + 

irregularly irregular


   Abdomen / GI:  normal bowel sounds, non tender, soft, no organomegaly, no 

pulsatile mass


   Extremities:  normal inspection, no calf tenderness, normal capillary refill

, no pedal edema


   Neurologic/Psychiatric:  alert, normal mood/affect, oriented x 3, + 

pertinent finding (gait mildly antalgic, uses a cane)


   Skin:  normal color, warm/dry, no rash





Hospital Course


Pt is an 88 yo male with a h/o chronic systolic CHF, valvular disease, PAF on 

coumadin, h/o DVT, h/o mantle cell lymphoma in remission, metastatic prostate CA

, admitted with atypical chest/epigastric pain that was relieved with NTG. He 

was also in A-fib with a rate of 100-112 on admission which improved with IVFs 

and metoprolol administration.


He remained chest pain free throughout the stay, but did have some recurrent 

epigastri cpain that was relieved with Maalox and Zantac within 1 hour. 

Troponins neg x 3. ECHO actually showed improvement in his EF from previous. 

Cardiology saw the pt and recommended no further testing, no stress test needed 

at this time, conservative management. He will be discharged to home on a low 

dose of metoprolol 12.5mg po bid. He apparently was on this in the past (Toprol 

XL 25mg daily) and it was stopped but he can't remember why. He is tolerating 

it fine here and his BPs were actually as high as 170s systolic on admission. 

He should also take Zantac 150mg po bid for GERD or gastritis symptoms. 


If not improving, he should seek GI referral from PCP.


As for his chronic systolic CHF, he had no evidence of decompensation here. It 

is noted he is not on an ACEI and this may be due to low normal BPs in the 

outpatient setting.


Will start ASA 81mg daily, metoprolol tartrate 12.5mg po bid on discharge; 

defer to PCP or Cardiology as to whether he could tolerate an ACEI at some 

point in the future.


He was ambulating the halls independently at the time of discharge and did not 

need skilled nursing.


Total Time Spent:  Greater than 30 minutes


This includes examination of the patient, discharge planning, medication 

reconciliation, and communication with other providers.





Discharge Instructions


Please refer to the electronic Patient Visit Report (Discharge Instructions) 

for additional information.





Follow-Up


PCP within 1-2 weeks





Additional Copies To


Masood Mark M.D.; Oliver Chen M.D.

## 2017-12-28 NOTE — CARDIOLOGY CONSULTATION
DATE OF CONSULTATION:  12/28/2017

 

REASON FOR CONSULTATION:  Chest pain.

 

PRIMARY CARDIOLOGIST:  Dr. Mark.

 

CONSULTATION REQUESTED BY:  Dr. Kemp.

 

HISTORY OF PRESENT ILLNESS:  Mr. Bradford is a very pleasant 89-year-old man

with a history of paroxysmal atrial fibrillation, chronic systolic heart

failure with an EF of previously 30-35%, mantle cell lymphoma, prostate

cancer status post recent bladder outlet obstruction resection,

hyperlipidemia and venous insufficiency who presented with chest pain

beginning yesterday.

 

The patient states he was in his usual state of health up until yesterday

when while at rest, developed some diffuse chest pain, the pain was

nonradiating, he described it more as a tightness/pressure, pain lasted for

in total of approximately 12 hours and went away overnight while he was in

the hospital.  The patient denies significant similar pain in the past.  He

has otherwise been feeling well.

 

Upon arrival to the ED, he was hemodynamically stable.  He was in aFib with

heart rates in the 90s-110s.  His initial troponin was negative and troponins

remain negative x3.  His initial EKG showed atrial fib with occasional PVCs

and subtle lateral T-wave changes but no thang signs of ischemia.  He was

admitted for observation overnight.  He has had no significant event on

telemetry.  He has been largely chest pain free over the course of today.

 

PAST MEDICAL HISTORY:  Includes:

1.  Cardiomyopathy, EF previously 30-35%.

2.  Chronic systolic heart failure, managed with p.r.n. diuretics.

3.  Mantle cell lymphoma, questionable thickening of his intestine on recent

surveillance imaging, which will be evaluated further.

4.  Prostate cancer status post recent bladder outlet obstruction and

resection.

5.  Resolved thrombocytopenia.

6.  Frequent PVCs.

7.  Hyperlipidemia.

8.  Venous insufficiency.

9.  Paroxysmal atrial fibrillation.

 

PAST SURGICAL HISTORY:  Status post appendectomy and prostatectomy and recent

bladder outlet obstruction surgery.

 

FAMILY HISTORY:  No family history of premature coronary disease or sudden

cardiac death.

 

SOCIAL HISTORY:  He is .  He is retired.  Previously, worked as a

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

 

CURRENT MEDICATIONS:  Include calcium, Casodex, Fish oil; furosemide 20 mg

1-2 tablets, based on weight of 177; gabapentin, Lupron, multivitamin and

warfarin.

 

ALLERGIES:  No known drug allergies.

 

REVIEW OF SYSTEMS:  A 10-point review of systems was completed and otherwise

negative unless stated in the HPI.

 

LABORATORY DATA:  White blood cell count 5.4, hemoglobin of 12.7, platelets

of 158.  His INR was 2.4.  Sodium 138, potassium 3.9, BUN 16, creatinine of

1.0.  Troponins have been negative x3.

 

IMAGING DATA:  Chest x-ray showed cardiomegaly with no acute cardiopulmonary

process.  Initial EKG showed atrial fibrillation with occasional PVCs.  There

was a subtle lateral T-wave changes consistent with some degree of LVH, no

significant change on subsequent EKG.  Echocardiogram from today showed

improved LV function with an EF of 40-45%.  There was normal RV size with

mild to moderate AI and mild MR.

 

IMPRESSION AND PLAN:

1.  Chest pain.

2.  Chronic systolic heart failure.

3.  Cardiomyopathy.

4.  Paroxysmal atrial fibrillation.

 

Mr. Bradford is an 89-year-old man here with chest pain for more than 12 hours

with no evidence of cardiac ischemia by cardiac markers or EKG.  I have

reviewed his recent echo and LV function, is improved from prior.  At this

point, suspicion that current chest pain is due to an acute coronary syndrome

is very low.  We discussed possible stress test to further evaluate for high

risk disease; however, in the setting of patient's comorbidities, feels

unlikely that this testing will significantly change long-term outcome and

the patient wishes to forego additional testing if possible.  



Otherwise, the patient remains in atrial fibrillation but is well rate 
controlled and

asymptomatic.  No significant congestion on exam  and will continue on current 
home 

diuretic regimen.  Blood pressure has been

slightly elevated today, although has been previously low at outpatient visits.
  Can

consider starting on low dose ACE inhibitor, if blood pressure remains an

issue.

 

Otherwise, the patient seems stable from a cardiac standpoint and can be

discharged home when other medical issues resolved.

 

Can follow up with his primary cardiologist, Dr. Mark in next several weeks.

 

Thank you for allowing us to participate in the care of this patient.

 

 

 

RACQUEL

## 2017-12-28 NOTE — NUR
A: PT IS A&OX4. VSS ON RA. DENIES CHEST PAIN AND SOB. PT IS OOB INDEPENDENTLY TO BATHROOM 
WITH CANE. TOLERATING DIET. A. FIB ON MONITOR. NSS + 20 KCL INFUSING PER ORDER. NO 
COMPLAINTS AT THIS TIME. PT IS RESTING IN BED. Q1H ROUNDING. CALL BELL WITHIN REACH. WILL 
CONTINUE TO MONITOR.

## 2017-12-28 NOTE — NUR
A: Patient resting in bed. A&O x4. VSS on room air. Afib on monitor. Patient states his 
chest pain has improved, he would state that its not even there anymore. Cardiac enzymes 
have been negative so far. IVF infusing at 50mL/hr into RT wrist. Will continue to monitor.

## 2017-12-28 NOTE — ECHOCARDIOGRAM REPORT
*NOTICE TO RECEIVING PARTY AGENCY**  This information is strictly Confidential and protected under 
[place] law.  [place] law prohibits you from making any further disclosure of this 
information unless further disclosure is expressly permitted by the written consent of the person to 
whom it pertains or is authorized by law.  A general authorization for the release of medical or 
other information is not sufficient for this purpose.  Hospital accepts no responsibility if the 
information is made available to any other person, INCLUDING THE PATIENT.



Interpretation Summary

  *  Name: MAGGY ARIAS  Study Date: 2017 08:23 AM  BP: 150/78 mmHg

  *  MRN: [id number]  Patient Location: [facility]\S\N289\S\1  HR: 84

  *  : 1928 (M/d/yyyy)  Gender: Male  Height: 69 in

  *  Age: 89 yrs  Ethnicity: [place]  Weight: 177 lb

  *  Ordering Physician: Lamont Jacobs

  *  Referring Physician: Self, Referred

  *  Performed By: Iva Rascon RCS

  *  Accession# [id number]  Account# [id number]

  *  Reason For Study: A-FIB

  *  BSA: 2.0 m2

  *  -- Conclusions --

  *  1. Normal LV size, mild concentric LVH.

  *  2. Mild global LV dysfunction.  LVEF 40-45%.

  *  3. Grossly normal RV size and funciton.

  *  4. Mild to moderate aortic regurgitation.  Aortic valve sclerosis.

  *  5. Mild mitral regurgitation.  Mild pulmonic regurgitation.

  *  6. Trace TR. Mild pulmonary hypertension.  Est PASP 40-45 mmHg.  Est RA 8 mmHg.

  *  7. Compared with prior study on 2015: LV function is improved.

Procedure Details

  *  A complete two-dimensional transthoracic echocardiogram was performed (2D, M-mode, Doppler and 
color flow Doppler).

Left Ventricle

  *  The left ventricle is grossly normal size.

  *  There is mild concentric left ventricular hypertrophy.

  *  Ejection Fraction = 40-45%.

  *  There is mild global hypokinesis of the left ventricle.

Right Ventricle

  *  The right ventricle is grossly normal size.

  *  The right ventricular systolic function is normal as assessed by tricuspid annular plane 
systolic excursion (TAPSE) (normal >1.5 cm).

Atria

  *  The left atrium is moderately dilated.

  *  The right atrium is severely dilated.

  *  No ASD detected; PFO is not assessed.

Mitral Valve

  *  The mitral valve is grossly normal.

  *  The mitral valve leaflets appear thickened, but open well.

  *  There is no mitral valve stenosis.

  *  There is mild mitral regurgitation.

Tricuspid Valve

  *  The tricuspid valve is not well visualized, but is grossly normal.

  *  There is trace tricuspid regurgitation.

Aortic Valve

  *  Aortic valve sclerosis mild, without significant aortic valvular stenosis.

  *  The aortic valve is trileaflet.

  *  Mild to moderate aortic regurgitation.

Pulmonic Valve

  *  The pulmonary valve is inadequately visualized, but the Doppler data is adequate for 
interpretation.

  *  Pulmonic stenosis is absent.

  *  Mild pulmonic valvular regurgitation.

Great Vessels

  *  The aortic root and proximal ascending aorta are normal sized.

Pericardium/Pleural

  *  There is no pericardial effusion.



MMode 2D Measurements and Calculations

IVSd 1.3 cm

IVSs 1.4 cm



LVIDd 5.9 cm

LVIDs 5.1 cm

LVPWd 1.2 cm

LVPWs 1.1 cm



IVS/LVPW 1.1 

FS 13.7 %

EDV(Teich) 176.1 ml

ESV(Teich) 125.4 ml

EF(Teich) 28.8 %



EDV(cubed) 209.9 ml

ESV(cubed) 134.9 ml

EF(cubed) 35.7 %

% IVS thick 11.2 %

% LVPW thick -8.36 %





LV mass(C)d 329.2 grams

LV mass(C)dI 167.9 grams/m\S\2

LV mass(C)s 266.3 grams

LV mass(C)sI 135.8 grams/m\S\2



SV(Teich) 50.7 ml

SI(Teich) 25.8 ml/m\S\2

SV(cubed) 75.0 ml

SI(cubed) 38.2 ml/m\S\2



Ao root diam 3.3 cm

Ao root area 8.6 cm\S\2

ACS 1.6 cm

LA dimension 5.1 cm



LA/Ao 1.6 

LVOT diam 2.0 cm

LVOT area 3.1 cm\S\2





LVAd ap4 32.9 cm\S\2

LVLd ap4 7.3 cm

EDV(MOD-sp4) 124.0 ml

EDV(sp4-el) 126.9 ml

LVAs ap4 22.8 cm\S\2

LVLs ap4 6.9 cm

ESV(MOD-sp4) 64.0 ml

ESV(sp4-el) 63.8 ml

EF(MOD-sp4) 48.4 %

EF(sp4-el) 49.7 %



LVAd ap2 33.6 cm\S\2

LVLd ap2 7.5 cm

EDV(MOD-sp2) 120.6 ml

EDV(sp2-el) 127.5 ml

LVAs ap2 23.3 cm\S\2

LVLs ap2 6.8 cm

ESV(MOD-sp2) 65.4 ml

ESV(sp2-el) 67.3 ml

EF(MOD-sp2) 45.7 %

EF(sp2-el) 47.2 %



LVLd %diff 3.4 %

EDV(MOD-bp) 125.6 ml

LVLs %diff -1.06 %

ESV(MOD-bp) 65.0 ml

EF(MOD-bp) 48.3 %



SV(MOD-sp4) 60.0 ml

SI(MOD-sp4) 30.6 ml/m\S\2





SV(MOD-sp2) 55.1 ml

SI(MOD-sp2) 28.1 ml/m\S\2



SV(MOD-bp) 60.6 ml

SI(MOD-bp) 30.9 ml/m\S\2



SV(sp4-el) 63.1 ml

SI(sp4-el) 32.2 ml/m\S\2



SV(sp2-el) 60.2 ml

SI(sp2-el) 30.7 ml/m\S\2







Doppler Measurements and Calculations

MV E max dejah 84.1 cm/sec



MV P1/2t max dejah 94.0 cm/sec

MV P1/2t 68.4 msec

MVA(P1/2t) 3.2 cm\S\2

MV dec slope 402.5 cm/sec\S\2

MV dec time 0.17 sec



Ao V2 max 166.9 cm/sec

Ao max PG 11.1 mmHg

Ao max PG (full) 9.3 mmHg

RUSS(V,A) 1.3 cm\S\2

RUSS(V,D) 1.3 cm\S\2



AI max dejah 464.0 cm/sec

AI max PG 86.1 mmHg

AI dec slope 217.3 cm/sec\S\2

AI P1/2t 625.3 msec





LV V1 max PG 1.8 mmHg



LV V1 max 67.4 cm/sec



MR max dejah 415.1 cm/sec

MR max PG 69.4 mmHg



PA V2 max 77.7 cm/sec

PA max PG 2.4 mmHg





PI max dejah 220.1 cm/sec

PI max PG 19.4 mmHg

PI dec slope 441.2 cm/sec\S\2

PI P1/2t 146.1 msec



TR max dejah 285.7 cm/sec

## 2017-12-28 NOTE — EMERGENCY ROOM VISIT NOTE
History


Report prepared by [place]:  Te Cornejo


Under the Supervision of:  Dr. Oliver Gonzalez M.D.


First contact with patient:  18:46


Chief Complaint:  CHEST PAIN


Stated Complaint:  CHEST PAIN





History of Present Illness


The patient is a 89 year old male who presents to the Emergency Room with 

complaints of constant chest pain that began this morning. He rates his 

discomfort as a 3/10 in severity. He reports that the pain is located in the 

center and intermittently in the lower portion of his chest. The patient 

describes his pain as a tightness and heaviness. The patient states that his 

chest pain began this morning after he woke up. He denies worsened symptoms 

with exertion, headache, syncope, cough, lightheadedness, flu symptoms, 

shortness of breath, dyspnea, nausea, vomiting, diaphoresis, leg swelling, 

weight changes, melena, hematochezia, and urinary symptoms. He states that he 

has a history of atrial fibrillation and CHF, which he takes Warfarin for. The 

patient reports a history of an appendectomy and a recent bladder resection 

that he had performed due to prostate blockage.





   Source of History:  patient


   Onset:  this morning


   Position:  chest (middle, lower)


   Symptom Intensity:  3/10


   Quality:  other (tightness, heaviness)


   Timing:  constant


   Associated Symptoms:  No LOC, No headache, No diaphoresis, No cough, No SOB, 

No nausea, No vomiting, No melena, No hematochezia, No urinary symptoms





Review of Systems


See HPI for pertinent positives and negatives.  A total of ten systems were 

reviewed and were otherwise negative.





Past Medical & Surgical


Medical Problems:


(1) Atrial fibrillation


(2) CHF (congestive heart failure)


(3) DVT (deep venous thrombosis)


(4) H/O blood clots


(5) Heart disease


(6) Lyme disease


(7) Mantle cell lymphoma


(8) Non-Hodgkin lymphoma


(9) Prostate cancer


(10) Shingles


(11) Substernal discomfort


Surgical Problems:


(1) H/O prostatectomy


(2) S/P appendectomy








Family History





Diabetes mellitus


FH: cancer


FH: heart disease


FH: lung disease


Hypertension





Social History


Smoking Status:  Never Smoker


Alcohol Use:  occasionally


Marital Status:  


Housing Status:  lives alone


Occupation Status:  unemployed





Current/Historical Medications


Scheduled


Bicalutamide (Casodex), 50 MG PO QAM


Calcium Carbonate-Vitamin D (Oyster Shell Calcium/D3 500-400 mg-Unit), 2 TABS 

PO DAILY


Gabapentin (Neurontin), 300 MG PO BID


Leuprolide Acetate (Lupron Depot), 30 MG IM Q4MO


Multivitamin (Multivitamin), 1 TAB PO QAM


Omega-3 Fatty Acids (Fish Oil 1200 mg), 1,200 MG PO DAILY


Warfarin Sodium (Coumadin), 1 TAB PO 6XWK


Warfarin Sodium (Warfarin Sodium), 7.5 MG PO WK





Scheduled PRN


Furosemide (Furosemide), 20-40 MG PO DAILY PRN for "DEPENDS ON WT GAIN".





Allergies


Coded Allergies:  


     Acetazolamide (Verified  Allergy, Unknown, Diamox Eye Drops ? rxn, 12/27/17

)





Physical Exam


Vital Signs











  Date Time  Temp Pulse Resp B/P (MAP) Pulse Ox O2 Delivery O2 Flow Rate FiO2


 


12/27/17 21:27  86 18 170/108 98 Room Air  


 


12/27/17 20:00  86 18 151/86 95 Room Air  


 


12/27/17 19:42     95 Room Air  


 


12/27/17 19:42     95 Room Air  


 


12/27/17 19:12  93      


 


12/27/17 18:42 36.7 112 18 117/71 98 Room Air  











Physical Exam


GENERAL: Awake, alert, well-appearing, in no distress


HENT: Normocephalic, atraumatic. Oropharynx unremarkable.


EYES: Normal conjunctiva. Sclera non-icteric.


NECK: Supple. No nuchal rigidity. FROM. No JVD.


RESPIRATORY: Clear to auscultation.


CARDIAC: borderline tachycardic, irregular rhythm. Extremities warm and well 

perfused. Pulses equal.


ABDOMEN: Soft, non-distended. No tenderness to palpation. No rebound or 

guarding. No masses.


RECTAL: Deferred.


MUSCULOSKELETAL: Chest examination reveals no tenderness. The back is 

symmetrical on inspection without obvious abnormality. There is no CVA 

tenderness to palpation. No joint edema. 


LOWER EXTREMITIES: Calves are equal size bilaterally and non-tender. 1+ edema. 

No discoloration. 


NEURO: Normal sensorium. No sensory or motor deficits noted. 


SKIN: No rash or jaundice noted.





Medical Decision & Procedures


ER Provider


Diagnostic Interpretation:


X-ray: Per my interpretation, radiologist review. 





SINGLE VIEW CHEST





CLINICAL HISTORY:  Generalized weakness.





FINDINGS: An AP, portable, upright chest radiograph is compared to study dated


8/11/2017 and correlated with chest CT dated 11/13/2017. The examination is


degraded by portable technique and apical lordotic positioning.  The heart is


enlarged and there is atherosclerotic calcification of the thoracic aorta. The


pulmonary vasculature is noncongested. Chronic interstitial thickening is


similar to previous. Atelectasis is seen at the left lung base. No airspace


consolidation, large pleural effusion, or pneumothorax is identified. The


skeletal structures are osteopenic. There are healed right-sided rib fractures.





IMPRESSION: Cardiomegaly with no acute cardiopulmonary abnormality.











Electronically signed by:  Howard Stratton M.D.


12/27/2017 7:16 PM





Dictated Date/Time:  12/27/2017 7:15 PM





Laboratory Results


12/27/17 19:14








Red Blood Count 4.14, Mean Corpuscular Volume 92.5, Mean Corpuscular Hemoglobin 

31.2, Mean Corpuscular Hemoglobin Concent 33.7, Mean Platelet Volume 10.9, 

Neutrophils (%) (Auto) 64.2, Lymphocytes (%) (Auto) 21.9, Monocytes (%) (Auto) 

10.1, Eosinophils (%) (Auto) 2.6, Basophils (%) (Auto) 0.7, Neutrophils # (Auto

) 3.92, Lymphocytes # (Auto) 1.34, Monocytes # (Auto) 0.62, Eosinophils # (Auto

) 0.16, Basophils # (Auto) 0.04





12/27/17 19:14

















Test


  12/27/17


19:14


 


White Blood Count


  6.11 K/uL


(4.8-10.8)


 


Red Blood Count


  4.14 M/uL


(4.7-6.1)


 


Hemoglobin


  12.9 g/dL


(14.0-18.0)


 


Hematocrit 38.3 % (42-52) 


 


Mean Corpuscular Volume


  92.5 fL


()


 


Mean Corpuscular Hemoglobin


  31.2 pg


(25-34)


 


Mean Corpuscular Hemoglobin


Concent 33.7 g/dl


(32-36)


 


Platelet Count


  169 K/uL


(130-400)


 


Mean Platelet Volume


  10.9 fL


(7.4-10.4)


 


Neutrophils (%) (Auto) 64.2 % 


 


Lymphocytes (%) (Auto) 21.9 % 


 


Monocytes (%) (Auto) 10.1 % 


 


Eosinophils (%) (Auto) 2.6 % 


 


Basophils (%) (Auto) 0.7 % 


 


Neutrophils # (Auto)


  3.92 K/uL


(1.4-6.5)


 


Lymphocytes # (Auto)


  1.34 K/uL


(1.2-3.4)


 


Monocytes # (Auto)


  0.62 K/uL


(0.11-0.59)


 


Eosinophils # (Auto)


  0.16 K/uL


(0-0.5)


 


Basophils # (Auto)


  0.04 K/uL


(0-0.2)


 


RDW Standard Deviation


  47.0 fL


(36.4-46.3)


 


RDW Coefficient of Variation


  13.8 %


(11.5-14.5)


 


Immature Granulocyte % (Auto) 0.5 % 


 


Immature Granulocyte # (Auto)


  0.03 K/uL


(0.00-0.02)


 


Prothrombin Time


  25.2 SECONDS


(9.0-12.0)


 


Prothromb Time International


Ratio 2.4 (0.9-1.1) 


 


 


Activated Partial


Thromboplast Time 41.3 SECONDS


(21.0-31.0)


 


Partial Thromboplastin Ratio 1.6 


 


Anion Gap


  10.0 mmol/L


(3-11)


 


Est Creatinine Clear Calc


Drug Dose 42.8 ml/min 


 


 


Estimated GFR (


American) 63.7 


 


 


Estimated GFR (Non-


American 54.9 


 


 


BUN/Creatinine Ratio 14.9 (10-20) 


 


Calcium Level


  8.6 mg/dl


(8.5-10.1)


 


Magnesium Level


  2.0 mg/dl


(1.8-2.4)


 


Total Bilirubin


  0.6 mg/dl


(0.2-1)


 


Direct Bilirubin


  0.2 mg/dl


(0-0.2)


 


Aspartate Amino Transf


(AST/SGOT) 35 U/L (15-37) 


 


 


Alanine Aminotransferase


(ALT/SGPT) 21 U/L (12-78) 


 


 


Alkaline Phosphatase


  175 U/L


()


 


Total Creatine Kinase


  87 U/L


()


 


Creatine Kinase MB


  2.3 ng/ml


(0.5-3.6)


 


Creatine Kinase MB Ratio 2.6 (0-3.0) 


 


Troponin I


  < 0.015 ng/ml


(0-0.045)


 


Total Protein


  6.6 gm/dl


(6.4-8.2)


 


Albumin


  3.1 gm/dl


(3.4-5.0)


 


Triglycerides Level


  85 mg/dl


(0-150)


 


Cholesterol Level


  164 mg/dl


(0-200)


 


HDL Cholesterol 39 mg/dl 


 


LDL Cholesterol, Calculated 108 mg/dl 


 


VLDL Cholesterol, Calculated 17 mg/dl 


 


Cholesterol/HDL Ratio 4.2 


 


Thyroid Stimulating Hormone


(TSH) 1.350 uIu/ml


(0.300-4.500)





Laboratory results reviewed by me





Medications Administered











 Medications


  (Trade)  Dose


 Ordered  Sig/Maria Ines


 Route  Start Time


 Stop Time Status Last Admin


Dose Admin


 


 Sodium Chloride  1,000 ml @ 


 125 mls/hr  Q8H STAT


 IV  12/27/17 18:49


 12/27/17 22:55 DC 12/27/17 18:49


125 MLS/HR


 


 Aspirin


  (Aspirin Chew)  324 mg  NOW  STAT


 PO  12/27/17 20:58


 12/27/17 20:59 DC 12/27/17 21:26


324 MG


 


 Nitroglycerin


  (Nitroglycerin


 2% Oint)  0.5 inch  NOW  ONCE


 EXT  12/27/17 21:00


 12/27/17 21:01 DC 12/27/17 21:27


0.5 INCH











ECG


Indication:  chest pain


Rate (beats per minute):  100


Rhythm:  atrial fibrillation


Findings:  PVC, other (Poor R Wave Progression anteriorly, lateral ST Depression

)


Comparison ECG Date:  8/11/17


Change:


PRWP is old, but lateral ST depression is more pronounced on new EKG.





ED Course


1849: Ordered Sodium Chloride 1000 ml @ 125 mls/hr IV.





1853: The patient was evaluated in room A02. A complete history and physical 

exam was performed.





2058: Ordered Aspirin 324 mg PO.





2100: Ordered Nitroglycerin 0.5 inch EXT.





2101: I reevaluated the patient and he is resting comfortably. I updated him on 

his results and discussed his treatment plan. He agrees to the plan and will be 

further evaluated.





2113: I discussed the patients case with Dr. Jacobs, [facility] Hospitalist. He 

understands the patients condition and agrees to accept the patient. The 

patient will be further evaluated.





Medical Decision


Triage Nursing notes reviewed.


The patient's presentation and history were concerning for chest pain.





Etiologies such as cardiac ischemia, aortic dissection, pulmonary embolism, 

pneumonia, pneumothorax, musculoskeletal, infections, gastrointestinal, as well 

as others were entertained.  





The patient was evaluated.  His ECG was concerning for subtle changes.  Blood 

work was obtained.  Chest x-ray was unremarkable.  His cardiac markers were 

negative.  Remainder of his blood work is unremarkable.  He was given a dose of 

aspirin and Nitropaste.  Because of his symptoms and workup he will require 

further evaluation and management in the hospital.  Consultation was made with 

internal medicine.  The patient was evaluated in the Emergency Room for further 

management.





Medication Reconcilliation


Current Medication List:  was personally reviewed by me





Blood Pressure Screening


Patient's blood pressure:  Normal blood pressure





Consults


Time Called:  2113


Consulting Physician:  Dr. Jacobs [facility] Hospitalist


Returned Call:  2113


I discussed the patients case with Dr. Jacobs [facility] Hospitalist. He 

understands the patients condition and agrees to accept the patient. The 

patient will be further evaluated.





Impression





 Primary Impression:  


 Substernal precordial chest pain





Scribe Attestation


The scribe's documentation has been prepared under my direction and personally 

reviewed by me in its entirety. I confirm that the note above accurately 

reflects all work, treatment, procedures, and medical decision making performed 

by me.





Departure Information


Dispostion


Being Evaluated By Hospitalist





Referrals


No Doctor, Assigned (PCP)





Patient Instructions


My [facility]

## 2018-01-02 ENCOUNTER — HOSPITAL ENCOUNTER (OUTPATIENT)
Dept: HOSPITAL 45 - C.EDB | Age: 83
Setting detail: OBSERVATION
LOS: 2 days | Discharge: HOME HEALTH | End: 2018-01-04
Attending: HOSPITALIST | Admitting: INTERNAL MEDICINE
Payer: COMMERCIAL

## 2018-01-02 VITALS
WEIGHT: 168.21 LBS | HEIGHT: 69 IN | HEIGHT: 69 IN | BODY MASS INDEX: 24.91 KG/M2 | BODY MASS INDEX: 24.91 KG/M2 | WEIGHT: 168.21 LBS

## 2018-01-02 DIAGNOSIS — Z80.0: ICD-10-CM

## 2018-01-02 DIAGNOSIS — Z79.01: ICD-10-CM

## 2018-01-02 DIAGNOSIS — Z79.82: ICD-10-CM

## 2018-01-02 DIAGNOSIS — I50.43: ICD-10-CM

## 2018-01-02 DIAGNOSIS — D69.6: ICD-10-CM

## 2018-01-02 DIAGNOSIS — C61: ICD-10-CM

## 2018-01-02 DIAGNOSIS — E83.42: ICD-10-CM

## 2018-01-02 DIAGNOSIS — I27.20: ICD-10-CM

## 2018-01-02 DIAGNOSIS — Z82.49: ICD-10-CM

## 2018-01-02 DIAGNOSIS — Z90.79: ICD-10-CM

## 2018-01-02 DIAGNOSIS — K21.9: ICD-10-CM

## 2018-01-02 DIAGNOSIS — R07.2: Primary | ICD-10-CM

## 2018-01-02 DIAGNOSIS — Z83.6: ICD-10-CM

## 2018-01-02 DIAGNOSIS — Z90.49: ICD-10-CM

## 2018-01-02 DIAGNOSIS — Z85.79: ICD-10-CM

## 2018-01-02 DIAGNOSIS — I48.91: ICD-10-CM

## 2018-01-02 DIAGNOSIS — I08.0: ICD-10-CM

## 2018-01-02 DIAGNOSIS — I42.9: ICD-10-CM

## 2018-01-02 DIAGNOSIS — Z83.3: ICD-10-CM

## 2018-01-02 DIAGNOSIS — E78.5: ICD-10-CM

## 2018-01-02 LAB
ALBUMIN SERPL-MCNC: 2.8 GM/DL (ref 3.4–5)
ALP SERPL-CCNC: 155 U/L (ref 45–117)
ALT SERPL-CCNC: 22 U/L (ref 12–78)
AST SERPL-CCNC: 45 U/L (ref 15–37)
BASOPHILS # BLD: 0.09 K/UL (ref 0–0.2)
BASOPHILS NFR BLD: 1.2 %
BUN SERPL-MCNC: 22 MG/DL (ref 7–18)
CALCIUM SERPL-MCNC: 9 MG/DL (ref 8.5–10.1)
CK MB SERPL-MCNC: 3.3 NG/ML (ref 0.5–3.6)
CO2 SERPL-SCNC: 26 MMOL/L (ref 21–32)
CREAT SERPL-MCNC: 1.33 MG/DL (ref 0.6–1.4)
EOS ABS #: 0.15 K/UL (ref 0–0.5)
EOSINOPHIL NFR BLD AUTO: 182 K/UL (ref 130–400)
GLUCOSE SERPL-MCNC: 105 MG/DL (ref 70–99)
HCT VFR BLD CALC: 37.3 % (ref 42–52)
HGB BLD-MCNC: 12.5 G/DL (ref 14–18)
IG#: 0.13 K/UL (ref 0–0.02)
IMM GRANULOCYTES NFR BLD AUTO: 23.9 %
INR PPP: 4 (ref 0.9–1.1)
LIPASE: 107 U/L (ref 73–393)
LYMPHOCYTES # BLD: 1.83 K/UL (ref 1.2–3.4)
MCH RBC QN AUTO: 30.7 PG (ref 25–34)
MCHC RBC AUTO-ENTMCNC: 33.5 G/DL (ref 32–36)
MCV RBC AUTO: 91.6 FL (ref 80–100)
MONO ABS #: 0.92 K/UL (ref 0.11–0.59)
MONOCYTES NFR BLD: 12 %
NEUT ABS #: 4.54 K/UL (ref 1.4–6.5)
NEUTROPHILS # BLD AUTO: 2 %
NEUTROPHILS NFR BLD AUTO: 59.2 %
PMV BLD AUTO: 10.5 FL (ref 7.4–10.4)
POTASSIUM SERPL-SCNC: 3.5 MMOL/L (ref 3.5–5.1)
PROT SERPL-MCNC: 6 GM/DL (ref 6.4–8.2)
PTT PATIENT: 42.9 SECONDS (ref 21–31)
RED CELL DISTRIBUTION WIDTH CV: 14.1 % (ref 11.5–14.5)
RED CELL DISTRIBUTION WIDTH SD: 47.4 FL (ref 36.4–46.3)
SODIUM SERPL-SCNC: 137 MMOL/L (ref 136–145)
WBC # BLD AUTO: 7.66 K/UL (ref 4.8–10.8)

## 2018-01-02 NOTE — EMERGENCY ROOM VISIT NOTE
History


Report prepared by [place]:  Te Cornejo


Under the Supervision of:  Dr. Juan Carlos Bran M.D.


First contact with patient:  14:22


Chief Complaint:  CHEST PAIN


Stated Complaint:  CHEST PAIN, FEELING POORLY


Nursing Triage Summary:  


chest pains started "again"  patient was recently here with afib and chest 


pains.





History of Present Illness


The patient is a 89 year old male who presents to the Emergency Room with 

complaints of resolved chest pain that began around 0930. He rates his 

discomfort as a 5/10 in severity and describes the pain as a dull sensation. 

The patient reports that he went to exercise class today and states that he 

felt normal. He states that following exercise class, he started to experience 

pain in his sternum. The patient reports that he is currently experience mild 

dyspnea. The patient states that he was here recently for the same symptoms and 

he reports that he stayed for observation with Dr. Rufifn, Cardiology. He denies 

radiation of pain, nausea, vomiting, and shortness of breath. The patient 

reports a history of atrial fibrillation and CHF. He states that he was put on 

Aspirin, Metoprolol, and Zantac.





   Source of History:  patient


   Onset:  0930


   Position:  chest


   Symptom Intensity:  5/10


   Quality:  dull


   Timing:  resolved


   Associated Symptoms:  No SOB, No nausea, No vomiting





Review of Systems


See HPI for pertinent positives & negatives. A total of 10 systems reviewed and 

were otherwise negative.





Past Medical & Surgical


Medical Problems:


(1) Atrial fibrillation


(2) CHF (congestive heart failure)


(3) DVT (deep venous thrombosis)


(4) H/O blood clots


(5) Heart disease


(6) Lyme disease


(7) Mantle cell lymphoma


(8) Non-Hodgkin lymphoma


(9) Prostate cancer


(10) Shingles


(11) Substernal discomfort


Surgical Problems:


(1) H/O prostatectomy


(2) S/P appendectomy





Old medical records were reviewed. Nurse's notes were reviewed and I agree with.





Family History





Diabetes mellitus


FH: cancer


FH: heart disease


FH: lung disease


Hypertension





Social History


Smoking Status:  Never Smoker


Alcohol Use:  occasionally


Drug Use:  none


Marital Status:  


Housing Status:  lives alone


Occupation Status:  unemployed





Current/Historical Medications


Scheduled


Aspirin (Aspirin EC Low Dose), 81 MG PO QAM


Bicalutamide (Casodex), 50 MG PO QAM


Calcium Carbonate-Vitamin D (Oyster Shell Calcium/D3 500-400 mg-Unit), 2 TABS 

PO DAILY


Gabapentin (Neurontin), 300 MG PO BID


Leuprolide Acetate (Lupron Depot), 30 MG IM Q4MO


Metoprolol Tartrate (Lopressor), 12.5 MG PO BID


Multivitamin (Multivitamin), 1 TAB PO QAM


Omega-3 Fatty Acids (Fish Oil 1200 mg), 1,200 MG PO DAILY


Ranitidine HCl (Ranitidine HCl), 150 MG PO BID


Warfarin Sodium (Coumadin), 1 TAB PO 6XWK


Warfarin Sodium (Warfarin Sodium), 7.5 MG PO WK





Scheduled PRN


Furosemide (Furosemide), 20-40 MG PO DAILY PRN for "DEPENDS ON WT GAIN".





Allergies


Coded Allergies:  


     Acetazolamide (Verified  Allergy, Unknown, Diamox Eye Drops ? rxn, 1/2/18)





Physical Exam


Vital Signs











  Date Time  Temp Pulse Resp B/P (MAP) Pulse Ox O2 Delivery O2 Flow Rate FiO2


 


1/2/18 19:31  90 22 143/79 95 Room Air  


 


1/2/18 18:38  87 19 142/103 96 Room Air  


 


1/2/18 18:28  91      


 


1/2/18 17:59  111 18  95   


 


1/2/18 17:55  88 18  97 Room Air  


 


1/2/18 17:50  92 20  97   


 


1/2/18 17:45  89 20  98   


 


1/2/18 17:40  78 18  98   


 


1/2/18 17:29  84 21 146/82 97 Room Air  


 


1/2/18 16:30  86 18 116/95 97 Room Air  


 


1/2/18 15:30  83 23 133/86 96 Room Air  


 


1/2/18 15:00  83 19 129/64 96 Room Air  


 


1/2/18 14:28  87      


 


1/2/18 14:12     93 Room Air  


 


1/2/18 14:12  82 16 121/68 97 Room Air  


 


1/2/18 13:49 36.7       


 


1/2/18 13:46     96 Room Air  


 


1/2/18 13:36  86 20 126/75 97 Room Air  











Physical Exam


General: Non-ill appearing older male in no acute distress. 


HEENT: Normal cephalic atraumatic.  Pupils are equal round and reactive to 

light.  Extraocular movements are intact.  Oropharynx is pink with moist mucous 

membranes.  No swelling of the mouth lips or tongue.


Neck: Supple with a midline trachea.  No meningeal signs or stiffness, no JVD 

or bruits. No Stridor.


Chest: Clear to auscultation bilaterally.  No wheezes or rhonchi.  No increased 

work of breathing.


Heart: Irregularly irregular with rate controlled a fib seen on the monitor


Abdomen: Soft nontender, nondistended without rebound guarding or rigidity.  


Extremities: No cyanosis clubbing or edema. No calf tenderness or assymetry


Spine/Back. Non tender to palpation. No CVA tenderness


Skin: Good turgor without rashes.


Neurologic exam: Cranial nerves two through 12 are intact.  Motor and sensation 

are intact and symmetrical throughout.





Medical Decision & Procedures


ER Provider


Diagnostic Interpretation:


X-ray results as stated below per interpretation by me and the radiologist: 





CHEST ONE VIEW PORTABLE





CLINICAL HISTORY: Chest pain.    





COMPARISON STUDY:  PET/CT December 13, 2017 and chest radiograph December 27, 2017.





FINDINGS: Lung volumes are normal. Mild left basilar opacity suggests


atelectasis. Note is made of moderate cardiomegaly without evidence of pulmonary


edema. There is no consolidation to suggest pneumonia. There are multiple old


bilateral rib fractures. No pneumothorax or pleural effusion is present.





IMPRESSION:  No acute cardiopulmonary findings. 














Electronically signed by:  Aleksander Hoffmann M.D.


1/2/2018 2:49 PM





Dictated Date/Time:  1/2/2018 2:48 PM





Laboratory Results


1/2/18 14:00








Red Blood Count 4.07, Mean Corpuscular Volume 91.6, Mean Corpuscular Hemoglobin 

30.7, Mean Corpuscular Hemoglobin Concent 33.5, Mean Platelet Volume 10.5, 

Neutrophils (%) (Auto) 59.2, Lymphocytes (%) (Auto) 23.9, Monocytes (%) (Auto) 

12.0, Eosinophils (%) (Auto) 2.0, Basophils (%) (Auto) 1.2, Neutrophils # (Auto

) 4.54, Lymphocytes # (Auto) 1.83, Monocytes # (Auto) 0.92, Eosinophils # (Auto

) 0.15, Basophils # (Auto) 0.09





1/2/18 14:00

















Test


  1/2/18


14:00 1/2/18


14:32 1/2/18


14:38


 


White Blood Count


  7.66 K/uL


(4.8-10.8) 


  


 


 


Red Blood Count


  4.07 M/uL


(4.7-6.1) 


  


 


 


Hemoglobin


  12.5 g/dL


(14.0-18.0) 


  


 


 


Hematocrit 37.3 % (42-52)   


 


Mean Corpuscular Volume


  91.6 fL


() 


  


 


 


Mean Corpuscular Hemoglobin


  30.7 pg


(25-34) 


  


 


 


Mean Corpuscular Hemoglobin


Concent 33.5 g/dl


(32-36) 


  


 


 


Platelet Count


  182 K/uL


(130-400) 


  


 


 


Mean Platelet Volume


  10.5 fL


(7.4-10.4) 


  


 


 


Neutrophils (%) (Auto) 59.2 %   


 


Lymphocytes (%) (Auto) 23.9 %   


 


Monocytes (%) (Auto) 12.0 %   


 


Eosinophils (%) (Auto) 2.0 %   


 


Basophils (%) (Auto) 1.2 %   


 


Neutrophils # (Auto)


  4.54 K/uL


(1.4-6.5) 


  


 


 


Lymphocytes # (Auto)


  1.83 K/uL


(1.2-3.4) 


  


 


 


Monocytes # (Auto)


  0.92 K/uL


(0.11-0.59) 


  


 


 


Eosinophils # (Auto)


  0.15 K/uL


(0-0.5) 


  


 


 


Basophils # (Auto)


  0.09 K/uL


(0-0.2) 


  


 


 


RDW Standard Deviation


  47.4 fL


(36.4-46.3) 


  


 


 


RDW Coefficient of Variation


  14.1 %


(11.5-14.5) 


  


 


 


Immature Granulocyte % (Auto) 1.7 %   


 


Immature Granulocyte # (Auto)


  0.13 K/uL


(0.00-0.02) 


  


 


 


Prothrombin Time


  41.3 SECONDS


(9.0-12.0) 


  


 


 


Prothromb Time International


Ratio 4.0 (0.9-1.1) 


  


  


 


 


Activated Partial


Thromboplast Time 42.9 SECONDS


(21.0-31.0) 


  


 


 


Partial Thromboplastin Ratio 1.7   


 


Anion Gap


  9.0 mmol/L


(3-11) 


  


 


 


Estimated GFR (


American) 54.5 


  


  


 


 


Estimated GFR (Non-


American 47.1 


  


  


 


 


BUN/Creatinine Ratio 16.3 (10-20)   


 


Calcium Level


  9.0 mg/dl


(8.5-10.1) 


  


 


 


Total Bilirubin


  0.5 mg/dl


(0.2-1) 


  


 


 


Direct Bilirubin


  0.1 mg/dl


(0-0.2) 


  


 


 


Aspartate Amino Transf


(AST/SGOT) 45 U/L (15-37) 


  


  


 


 


Alanine Aminotransferase


(ALT/SGPT) 22 U/L (12-78) 


  


  


 


 


Alkaline Phosphatase


  155 U/L


() 


  


 


 


Total Creatine Kinase


  96 U/L


() 


  


 


 


Creatine Kinase MB


  3.3 ng/ml


(0.5-3.6) 


  


 


 


Pro-B-Type Natriuretic Peptide


  3504 pg/ml


(0-1800) 


  


 


 


Total Protein


  6.0 gm/dl


(6.4-8.2) 


  


 


 


Albumin


  2.8 gm/dl


(3.4-5.0) 


  


 


 


Lipase


  107 U/L


() 


  


 


 


Creatine Kinase MB Ratio   (0-3.0)  


 


Bedside Troponin I


  


  


  0.030 ng/ml


(0-0.045)





Laboratory studies as stated above per my review.





ECG


Indication:  chest pain


Rate (beats per minute):  86


Rhythm:  atrial fibrillation


Findings:  no acute ischemic change, other (Lateral t wave abnormality)


Comparison ECG Date:  12/28/17


Change:  no significant change





ED Course


1425: Past medical records reviewed. The patient was evaluated in room B05, and 

a complete history and physical examination were performed.





1640: I reevaluated the patient and he is resting comfortably.





1650: I discussed the patients case with Dr. Ruffin, [facility]. He agrees 

to further evaluation.





1805: I discussed the patients case with Dr. Nilson Alonso, [facility] Hospitalist. 

He understands the patients condition and agrees to accept the patient. The 

patient will be further evaluated.





Medical Decision


Differentials include, but are not limited to; cardiac disease, a fib, CHF, and 

electrolyte or metabolic abnormality.





This patient comes in as described above.  He was recently admitted for similar 

pains had ruled out by enzymes but do not have any provocative testing.  He is 

on Coumadin for blood thinning purposes.  IV access was established.  EKG was 

obtained as well as chest x-ray multiple blood testing.  EKG shows no acute ST 

segment elevations some lateral depression which is about the same as 4 his 

cardiac biomarkers are not elevated his BNP is elevated however his lungs are 

clear on x-ray exam.  I did discuss the case with Dr. Ruffin to was his 

cardiologist is in the hospital couple days ago and he did recommend keeping 

him for further treatment and evaluation.  I have consulted the [facility] hospitalist to see him in the ER.





Medication Reconcilliation


Current Medication List:  was personally reviewed by me





Blood Pressure Screening


Patient's blood pressure:  Normal blood pressure





Consults


Time Called:  1646


Consulting Physician:  Dr. Ruffin, [facility]


Returned Call:  1650


 I discussed the patients case with Dr. Ruffin [facility] Card[facility]. He agrees to 

further evaluation.


Additional Consults:  


   Time Called:  1756


   Consulted Physician:  Dr. Linda Alonso, [facility] Hospitalist


   Returned Call:  1805


Additional Comments:


I discussed the patients case with Dr. Nilson Alonso [facility] Hospitalist. He 

understands the patients condition and agrees to accept the patient. The 

patient will be further evaluated.





Impression





 Primary Impression:  


 Precordial chest pain





Scribe Attestation


The scribe's documentation has been prepared under my direction and personally 

reviewed by me in its entirety. I confirm that the note above accurately 

reflects all work, treatment, procedures, and medical decision making performed 

by me.





Departure Information


Dispostion


Being Evaluated By Hospitalist





Referrals


Oliver Chen M.D. (PCP)





Patient Instructions


My [facility]

## 2018-01-02 NOTE — HISTORY AND PHYSICAL
History & Physical


Date & Time of Service:


Jan 2, 2018 at 23:12


Chief Complaint:


Chest Pain, Feeling Poorly


Primary Care Physician:


Oilver Chen M.D.


History of Present Illness


Source:  patient


89 years old man with past medical history of chronic systolic congestive heart 

failure, atrial fibrillation, prostate cancer, dyslipidemia and Mantle cell 

lymphoma.  Presented to the ED with substernal chest pain started after 

exertion today.


Pain was moderate in intensity, localized in the substernal area.


No associated symptoms


No aggravating or relieving factors.


Pain resolved spontaneously after arriving to ED.


This is his second ED visit for chest pain.


ED physician discussed the case with cardiologist recommended admitting the 

patient to rule out ACS





Past Medical/Surgical History


Medical Problems:


(1) Atrial fibrillation


Status: Chronic  





(2) CHF (congestive heart failure)


Status: Chronic  





(3) DVT (deep venous thrombosis)


Status: Chronic  





(4) H/O blood clots


Status: Chronic  





(5) Heart disease


Status: Chronic  





(6) Lyme disease


Status: Chronic  





(7) Mantle cell lymphoma


Status: Chronic  





(8) Non-Hodgkin lymphoma


Status: Chronic  





(9) Prostate cancer


Status: Chronic  





(10) Shingles


Status: Resolved  





Surgical Problems:


(1) H/O prostatectomy


Status: Resolved  





(2) S/P appendectomy


Status: Resolved  








Family History





Diabetes mellitus


FH: cancer


FH: heart disease


FH: lung disease


Hypertension





Social History


Smoking Status:  Never Smoker


Drug Use:  none


Marital Status:  


Housing status:  lives with family


Occupational Status:  unemployed





Immunizations


History of Influenza Vaccine:  Unknown


History of Tetanus Vaccine?:  utd


History of Pneumococcal:  Unknown


History of Hepatitis B Vaccine:  No





Multi-Drug Resistant Organisms


History of MDRO:  No





Allergies


Coded Allergies:  


     Acetazolamide (Verified  Allergy, Unknown, Diamox Eye Drops ? rxn, 1/2/18)





Home Medications


Scheduled


Aspirin (Aspirin EC Low Dose), 81 MG PO QAM


Bicalutamide (Casodex), 50 MG PO QAM


Calcium Carbonate-Vitamin D (Oyster Shell Calcium/D3 500-400 mg-Unit), 2 TABS 

PO DAILY


Gabapentin (Neurontin), 300 MG PO BID


Leuprolide Acetate (Lupron Depot), 30 MG IM Q4MO


Metoprolol Tartrate (Lopressor), 12.5 MG PO BID


Multivitamin (Multivitamin), 1 TAB PO QAM


Omega-3 Fatty Acids (Fish Oil 1200 mg), 1,200 MG PO DAILY


Ranitidine HCl (Ranitidine HCl), 150 MG PO BID


Warfarin Sodium (Coumadin), 1 TAB PO 6XWK


Warfarin Sodium (Warfarin Sodium), 7.5 MG PO WK





Scheduled PRN


Furosemide (Furosemide), 20-40 MG PO DAILY PRN for "DEPENDS ON WT GAIN".





Review of Systems


Constitutional:  No fever, No chills, No sweats, No weight loss, No weakness, 

No fatigue, No problem reported


Eyes:  No worsening of vision, No eye pain, No redness, No discharge, No 

diplopia, No problem reported


ENT:  No hearing loss, No unusual epistaxis, No nasal symptoms, No sore throat, 

No tinnitus, No dental problems, No trouble swallowing, No problem reported


Respiratory:  No cough, No sputum, No wheezing, No shortness of breath, No 

dyspnea on exertion, No dyspnea at rest, No hemoptysis, No problem reported


Cardiovascular:  + chest pain, No orthopnea, No PND, No edema, No claudication, 

No palpitations, No problem reported


Abdomen:  No pain, No nausea, No vomiting, No diarrhea, No constipation, No GI 

bleeding, No problem reported


Musculoskeletal:  No joint pain, No muscle pain, No swelling, No calf pain, No 

problem reported


Genitourinary - Male:  No hematuria, No dysuria, No urinary frequency, No 

urinary urgency, No urinary hesitancy, No urinary retention, No urinary 

incontinence, No penile discharge, No lesions, No impotence, No problem reported


Neurologic:  No memory loss, No paralysis, No weakness, No numbness/tingling, 

No vertigo, No balance problems, No problem reported


Psychiatric:  No depression symptoms, No anhedonism, No anxiety, No insomnia, 

No substance abuse, No problem reported


Endocrine:  No fatigue, No excessive thirst, No excessive urination, No problem 

reported


Hematologic / Lymphatic:  No abnormal bleeding/bruising, No clotting problems, 

No swollen lymph nodes, No night sweats, No problem reported


Integumentary:  No rash, No itch, No new/changing skin lesions, No color change

, No bleeding, No problem reported


Allergic / Immunologic:  No environmental allergies, No seasonal allergies, No 

pet sensitivities, No food allergies, No hives, No frequent infections, No poor 

healing, No prolonged convalescence, No problem reported





Physical Exam


Vital Signs











  Date Time  Temp Pulse Resp B/P (MAP) Pulse Ox O2 Delivery O2 Flow Rate FiO2


 


1/2/18 22:36  99      


 


1/2/18 22:30  96 13 129/76 94 Room Air  


 


1/2/18 21:59  103 22 137/77 95 Room Air  


 


1/2/18 21:01    157/93    


 


1/2/18 21:00  96 26     


 


1/2/18 20:30  96 25 155/94 96   


 


1/2/18 20:00  95 25 132/63 93   


 


1/2/18 19:31  90 22 143/79 95 Room Air  


 


1/2/18 18:38  87 19 142/103 96 Room Air  


 


1/2/18 18:28  91      


 


1/2/18 17:59  111 18  95   


 


1/2/18 17:55  88 18  97 Room Air  


 


1/2/18 17:50  92 20  97   


 


1/2/18 17:45  89 20  98   


 


1/2/18 17:40  78 18  98   


 


1/2/18 17:29  84 21 146/82 97 Room Air  


 


1/2/18 16:30  86 18 116/95 97 Room Air  


 


1/2/18 15:30  83 23 133/86 96 Room Air  


 


1/2/18 15:00  83 19 129/64 96 Room Air  


 


1/2/18 14:28  87      


 


1/2/18 14:12     93 Room Air  


 


1/2/18 14:12  82 16 121/68 97 Room Air  


 


1/2/18 13:49 36.7       


 


1/2/18 13:46     96 Room Air  


 


1/2/18 13:36  86 20 126/75 97 Room Air  








General Appearance:  WD/WN, no apparent distress


Head:  normocephalic, atraumatic


Eyes:  normal inspection, EOMI


ENT:  normal ENT inspection, hearing grossly normal


Neck:  supple


Respiratory/Chest:  chest non-tender, lungs clear, normal breath sounds, no 

respiratory distress, no accessory muscle use


Cardiovascular:  regular rate, rhythm, no edema, no gallop, no JVD, no murmur, 

normal peripheral pulses


Abdomen/GI:  normal bowel sounds, non tender, soft, no organomegaly, no 

pulsatile mass


Back:  normal inspection


Extremities/Musculoskelatal:  normal inspection, no calf tenderness, normal 

capillary refill, no pedal edema, normal range of motion


Neurologic/Psych:  CNs II-XII nml as tested, no motor/sensory deficits, alert, 

normal mood/affect, normal reflexes, oriented x 3


Skin:  normal color, warm/dry, no rash





Diagnostics


Laboratory Results





Results Past 24 Hours








Test


  1/2/18


14:00 1/2/18


14:32 1/2/18


14:38 Range/Units


 


 


White Blood Count 7.66   4.8-10.8  K/uL


 


Red Blood Count 4.07   4.7-6.1  M/uL


 


Hemoglobin 12.5   14.0-18.0  g/dL


 


Hematocrit 37.3   42-52  %


 


Mean Corpuscular Volume 91.6     fL


 


Mean Corpuscular Hemoglobin 30.7   25-34  pg


 


Mean Corpuscular Hemoglobin


Concent 33.5


  


  


  32-36  g/dl


 


 


Platelet Count 182   130-400  K/uL


 


Mean Platelet Volume 10.5   7.4-10.4  fL


 


Neutrophils (%) (Auto) 59.2    %


 


Lymphocytes (%) (Auto) 23.9    %


 


Monocytes (%) (Auto) 12.0    %


 


Eosinophils (%) (Auto) 2.0    %


 


Basophils (%) (Auto) 1.2    %


 


Neutrophils # (Auto) 4.54   1.4-6.5  K/uL


 


Lymphocytes # (Auto) 1.83   1.2-3.4  K/uL


 


Monocytes # (Auto) 0.92   0.11-0.59  K/uL


 


Eosinophils # (Auto) 0.15   0-0.5  K/uL


 


Basophils # (Auto) 0.09   0-0.2  K/uL


 


RDW Standard Deviation 47.4   36.4-46.3  fL


 


RDW Coefficient of Variation 14.1   11.5-14.5  %


 


Immature Granulocyte % (Auto) 1.7    %


 


Immature Granulocyte # (Auto) 0.13   0.00-0.02  K/uL


 


Prothrombin Time


  41.3


  


  


  9.0-12.0


SECONDS


 


Prothromb Time International


Ratio 4.0


  


  


  0.9-1.1  


 


 


Activated Partial


Thromboplast Time 42.9


  


  


  21.0-31.0


SECONDS


 


Partial Thromboplastin Ratio 1.7    


 


Sodium Level 137   136-145  mmol/L


 


Potassium Level 3.5   3.5-5.1  mmol/L


 


Chloride Level 102     mmol/L


 


Carbon Dioxide Level 26   21-32  mmol/L


 


Anion Gap 9.0   3-11  mmol/L


 


Blood Urea Nitrogen 22   7-18  mg/dl


 


Creatinine


  1.33


  


  


  0.60-1.40


mg/dl


 


Estimated GFR (


American) 54.5


  


  


   


 


 


Estimated GFR (Non-


American 47.1


  


  


   


 


 


BUN/Creatinine Ratio 16.3   10-20  


 


Random Glucose 105   70-99  mg/dl


 


Calcium Level 9.0   8.5-10.1  mg/dl


 


Total Bilirubin 0.5   0.2-1  mg/dl


 


Direct Bilirubin 0.1   0-0.2  mg/dl


 


Aspartate Amino Transf


(AST/SGOT) 45


  


  


  15-37  U/L


 


 


Alanine Aminotransferase


(ALT/SGPT) 22


  


  


  12-78  U/L


 


 


Alkaline Phosphatase 155     U/L


 


Total Creatine Kinase 96     U/L


 


Creatine Kinase MB 3.3   0.5-3.6  ng/ml


 


Creatine Kinase MB Ratio 3.4   0-3.0  


 


Pro-B-Type Natriuretic Peptide 3504   0-1800  pg/ml


 


Total Protein 6.0   6.4-8.2  gm/dl


 


Albumin 2.8   3.4-5.0  gm/dl


 


Lipase 107     U/L


 


Bedside Troponin I   0.030 0-0.045  ng/ml











Impression


Assessment and Plan


Assessment


Chest pain rule out ACS


Coumadin coagulopathy 


chronic cardiomyopathy, EF previously 30-35%.


Chronic systolic heart failure, not in exacerbation


History of Mantle cell lymphoma, questionable thickening of his intestine 


Prostate cancer status post recent resection.


Frequent PVCs.


Hyperlipidemia.


Venous insufficiency.


Paroxysmal atrial fibrillation.  On Coumadin








plan:


admit to telemetry


obtain serial cardiac enz


NTG SL/topical prn CP


consult cardiologist


pain management


Check hemoglobin A1c/lipids to stratify patient risk factors


repeat EKG prn chest pain


Hold Coumadin and check INR and a





Resuscitation Status


FULL RESUSCITATION





VTE Prophylaxis


VTE Risk Assessment Done? Y/N:  Yes


Risk Level:  Moderate

## 2018-01-03 VITALS
OXYGEN SATURATION: 96 % | HEART RATE: 85 BPM | DIASTOLIC BLOOD PRESSURE: 77 MMHG | SYSTOLIC BLOOD PRESSURE: 137 MMHG | TEMPERATURE: 97.7 F

## 2018-01-03 VITALS
TEMPERATURE: 98.6 F | OXYGEN SATURATION: 94 % | HEART RATE: 75 BPM | DIASTOLIC BLOOD PRESSURE: 70 MMHG | SYSTOLIC BLOOD PRESSURE: 130 MMHG

## 2018-01-03 VITALS
DIASTOLIC BLOOD PRESSURE: 78 MMHG | TEMPERATURE: 98.24 F | HEART RATE: 86 BPM | SYSTOLIC BLOOD PRESSURE: 146 MMHG | OXYGEN SATURATION: 95 %

## 2018-01-03 VITALS
HEART RATE: 106 BPM | DIASTOLIC BLOOD PRESSURE: 94 MMHG | TEMPERATURE: 98.24 F | OXYGEN SATURATION: 93 % | SYSTOLIC BLOOD PRESSURE: 159 MMHG

## 2018-01-03 VITALS — OXYGEN SATURATION: 94 %

## 2018-01-03 VITALS
OXYGEN SATURATION: 97 % | HEART RATE: 113 BPM | TEMPERATURE: 98.06 F | DIASTOLIC BLOOD PRESSURE: 86 MMHG | SYSTOLIC BLOOD PRESSURE: 176 MMHG

## 2018-01-03 VITALS
DIASTOLIC BLOOD PRESSURE: 71 MMHG | TEMPERATURE: 98.24 F | OXYGEN SATURATION: 95 % | SYSTOLIC BLOOD PRESSURE: 146 MMHG | HEART RATE: 78 BPM

## 2018-01-03 VITALS — OXYGEN SATURATION: 96 %

## 2018-01-03 VITALS — OXYGEN SATURATION: 95 %

## 2018-01-03 LAB
ALBUMIN SERPL-MCNC: 2.6 GM/DL (ref 3.4–5)
ALP SERPL-CCNC: 149 U/L (ref 45–117)
ALT SERPL-CCNC: 20 U/L (ref 12–78)
AST SERPL-CCNC: 45 U/L (ref 15–37)
BASOPHILS # BLD: 0.07 K/UL (ref 0–0.2)
BASOPHILS NFR BLD: 0.9 %
BUN SERPL-MCNC: 21 MG/DL (ref 7–18)
CALCIUM SERPL-MCNC: 8.6 MG/DL (ref 8.5–10.1)
CO2 SERPL-SCNC: 26 MMOL/L (ref 21–32)
CREAT SERPL-MCNC: 1.11 MG/DL (ref 0.6–1.4)
EOS ABS #: 0.17 K/UL (ref 0–0.5)
EOSINOPHIL NFR BLD AUTO: 155 K/UL (ref 130–400)
GLUCOSE SERPL-MCNC: 106 MG/DL (ref 70–99)
HCT VFR BLD CALC: 36.6 % (ref 42–52)
HGB BLD-MCNC: 12.1 G/DL (ref 14–18)
IG#: 0.19 K/UL (ref 0–0.02)
IMM GRANULOCYTES NFR BLD AUTO: 21 %
INR PPP: 3.7 (ref 0.9–1.1)
LYMPHOCYTES # BLD: 1.71 K/UL (ref 1.2–3.4)
MCH RBC QN AUTO: 30 PG (ref 25–34)
MCHC RBC AUTO-ENTMCNC: 33.1 G/DL (ref 32–36)
MCV RBC AUTO: 90.6 FL (ref 80–100)
MONO ABS #: 0.94 K/UL (ref 0.11–0.59)
MONOCYTES NFR BLD: 11.5 %
NEUT ABS #: 5.06 K/UL (ref 1.4–6.5)
NEUTROPHILS # BLD AUTO: 2.1 %
NEUTROPHILS NFR BLD AUTO: 62.2 %
NRBC BLD AUTO-RTO: 0.2 %
NUCLEATED RED BLOOD CELL ABS: 0.02 K/UL (ref 0–0)
PMV BLD AUTO: 10.3 FL (ref 7.4–10.4)
POTASSIUM SERPL-SCNC: 3.6 MMOL/L (ref 3.5–5.1)
PROT SERPL-MCNC: 5.7 GM/DL (ref 6.4–8.2)
RED CELL DISTRIBUTION WIDTH CV: 13.9 % (ref 11.5–14.5)
RED CELL DISTRIBUTION WIDTH SD: 45.8 FL (ref 36.4–46.3)
SODIUM SERPL-SCNC: 136 MMOL/L (ref 136–145)
WBC # BLD AUTO: 8.14 K/UL (ref 4.8–10.8)

## 2018-01-03 RX ADMIN — ACETAMINOPHEN PRN MG: 325 TABLET ORAL at 16:39

## 2018-01-03 RX ADMIN — ACETAMINOPHEN PRN MG: 325 TABLET ORAL at 22:22

## 2018-01-03 RX ADMIN — ATORVASTATIN CALCIUM SCH MG: 10 TABLET, FILM COATED ORAL at 08:52

## 2018-01-03 RX ADMIN — ACETAMINOPHEN PRN MG: 325 TABLET ORAL at 04:01

## 2018-01-03 RX ADMIN — Medication SCH MG: at 08:52

## 2018-01-03 RX ADMIN — RANITIDINE SCH MG: 150 TABLET ORAL at 21:00

## 2018-01-03 RX ADMIN — Medication SCH TAB: at 08:51

## 2018-01-03 RX ADMIN — RANITIDINE SCH MG: 150 TABLET ORAL at 08:52

## 2018-01-03 RX ADMIN — METOPROLOL TARTRATE SCH MG: 25 TABLET, FILM COATED ORAL at 08:52

## 2018-01-03 RX ADMIN — GABAPENTIN SCH MG: 300 CAPSULE ORAL at 08:52

## 2018-01-03 RX ADMIN — ACETAMINOPHEN PRN MG: 325 TABLET ORAL at 08:51

## 2018-01-03 RX ADMIN — METOPROLOL TARTRATE SCH MG: 25 TABLET, FILM COATED ORAL at 21:00

## 2018-01-03 RX ADMIN — AMIODARONE HYDROCHLORIDE SCH MG: 200 TABLET ORAL at 20:59

## 2018-01-03 RX ADMIN — BICALUTAMIDE SCH MG: 50 TABLET ORAL at 08:52

## 2018-01-03 RX ADMIN — GABAPENTIN SCH MG: 300 CAPSULE ORAL at 20:59

## 2018-01-03 RX ADMIN — AMIODARONE HYDROCHLORIDE SCH MG: 200 TABLET ORAL at 09:20

## 2018-01-03 NOTE — HOSPITALIST PROGRESS NOTE
Hospitalist Progress Note


Date of Service


Nasir 3, 2018.


 (Meena Don ., PA-C)





Subjective


Pt evaluation today including:  conversation w/ patient, physical exam, lab 

review, review of studies, conversation w/ consultant (Cardiology- [facility] )

, review of inpatient medication list


Voiding:  no voiding problems


Patient states he is feeling well this AM. 


Eating and drinking OK. 


No more episodes of chest pain. 


   Cardiology believes symptoms due to ventricular rate- started Amiodarone 


Ambulating to bathroom w/ rolling walker w/out significant difficulty. 


+fatigue today- little sleep last evening. 





Patient denies any fever, chills, sweats, lightheadedness, dizziness, vision 

changes, CP, palpitations, edema, SOB, wheezing, cough, abdominal pain, nausea, 

vomiting, diarrhea, urinary symptoms, melena, numbness/tingling, weakness, 

muscle/joint pain, anxiety/depression, active bleeding, or new skin 

discoloration/changes. 


 (Meena Don ., PA-C)





Medications





Current Inpatient Medications








 Medications


  (Trade)  Dose


 Ordered  Sig/Maria Ines


 Route  Start Time


 Stop Time Status Last Admin


Dose Admin


 


 Aspirin


  (Ecotrin Tab)  81 mg  QAM


 PO  1/3/18 09:00


 2/2/18 08:59  1/3/18 08:52


81 MG


 


 Bicalutamide


  (Casodex Tab)  50 mg  QAM


 PO  1/3/18 09:00


 2/2/18 08:59  1/3/18 08:52


50 MG


 


 Gabapentin


  (Neurontin Cap)  300 mg  BID


 PO  1/3/18 09:00


 2/2/18 08:59  1/3/18 08:52


300 MG


 


 Metoprolol


 Tartrate


  (Lopressor Tab)  12.5 mg  BID


 PO  1/3/18 09:00


 2/2/18 08:59  1/3/18 08:52


12.5 MG


 


 Multivitamins


  (Multivitamin


 Tab)  1 tab  QAM


 PO  1/3/18 09:00


 2/2/18 08:59  1/3/18 08:51


1 TAB


 


 Ranitidine HCl


  (zANTac TAB)  150 mg  BID


 PO  1/3/18 09:00


 2/2/18 08:59  1/3/18 08:52


150 MG


 


 Atorvastatin


 Calcium


  (Lipitor Tab)  10 mg  QAM


 PO  1/3/18 09:00


 2/2/18 08:59  1/3/18 08:52


10 MG


 


 Sodium Chloride  1,000 ml @ 


 35 mls/hr  Q24H


 IV  1/2/18 23:06


 2/1/18 23:05  1/3/18 02:05


35 MLS/HR


 


 Acetaminophen


  (Tylenol Tab)  650 mg  Q4H  PRN


 PO  1/2/18 23:15


 2/1/18 23:14  1/3/18 08:51


650 MG


 


 Al Hydrox/Mg


 Hydrox/Simethicone


  (Maalox Max Susp)  15 ml  Q4H  PRN


 PO  1/2/18 23:15


 2/1/18 23:14   


 


 


 Magnesium


 Hydroxide


  (Milk Of


 Magnesia Susp)  30 ml  Q12H  PRN


 PO  1/2/18 23:15


 2/1/18 23:14   


 


 


 Zolpidem Tartrate


  (Ambien Tab)  5 mg  HSZ  PRN


 PO  1/2/18 23:15


 2/1/18 23:14   


 


 


 Ondansetron HCl


  (Zofran Inj)  4 mg  Q6H  PRN


 IV  1/2/18 23:15


 2/1/18 23:14   


 


 


 Nitroglycerin


  (Nitrostat Tab)  0.4 mg  UD  PRN


 SL  1/2/18 23:15


 2/1/18 23:14   


 


 


 Morphine Sulfate


  (MoRPHine


 SULFATE INJ)  2 mg  Q30M  PRN


 IV  1/2/18 23:15


 1/16/18 23:14   


 


 


 Polyethylene


  (Miralax Powder


 Packet)  17 gm  DAILY  PRN


 PO  1/2/18 23:15


 2/1/18 23:14   


 


 


 Miscellaneous


  (Iv Fluids


 Completed)  1 ea  PRN  PRN


 N/A  1/3/18 01:30


 1/3/19 01:29   


 


 


 Amiodarone HCl


  (Cordarone Tab)  200 mg  BID


 PO  1/3/18 09:00


 2/2/18 08:59  1/3/18 09:20


200 MG








 (Meena Don PA-C)





Objective


Vital Signs











  Date Time  Temp Pulse Resp B/P (MAP) Pulse Ox O2 Delivery O2 Flow Rate FiO2


 


1/3/18 11:30 36.8 78 18 146/71 (96) 95 Room Air  


 


1/3/18 07:46 37.0 75 18 130/70 (90) 94 Room Air  


 


1/3/18 07:30     94 Room Air  


 


1/3/18 04:00 36.8 106 16 159/94 (115) 93 Room Air  


 


1/3/18 04:00      Room Air  


 


1/3/18 00:37  109  163/97    


 


1/3/18 00:32 36.7 113 16 176/86 97 Room Air  


 


1/2/18 23:00  108 19 166/97 96 Room Air  


 


1/2/18 22:36  99      


 


1/2/18 22:30  96 13 129/76 94 Room Air  


 


1/2/18 21:59  103 22 137/77 95 Room Air  


 


1/2/18 21:01    157/93    


 


1/2/18 21:00  96 26     


 


1/2/18 20:30  96 25 155/94 96   


 


1/2/18 20:00  95 25 132/63 93   


 


1/2/18 19:31  90 22 143/79 95 Room Air  


 


1/2/18 18:38  87 19 142/103 96 Room Air  


 


1/2/18 18:28  91      


 


1/2/18 17:59  111 18  95   


 


1/2/18 17:55  88 18  97 Room Air  


 


1/2/18 17:50  92 20  97   


 


1/2/18 17:45  89 20  98   


 


1/2/18 17:40  78 18  98   


 


1/2/18 17:29  84 21 146/82 97 Room Air  


 


1/2/18 16:30  86 18 116/95 97 Room Air  


 


1/2/18 15:30  83 23 133/86 96 Room Air  


 


1/2/18 15:00  83 19 129/64 96 Room Air  


 


1/2/18 14:28  87      


 


1/2/18 14:12     93 Room Air  


 


1/2/18 14:12  82 16 121/68 97 Room Air  


 


1/2/18 13:49 36.7       


 


1/2/18 13:46     96 Room Air  


 


1/2/18 13:36  86 20 126/75 97 Room Air  








 (Meena Don, PA-C)





Physical Exam


General Appearance:  no apparent distress


Eyes:  normal inspection, PERRL


ENT:  hearing grossly normal


Neck:  supple


Respiratory/Chest:  lungs clear, no respiratory distress, no accessory muscle 

use


Cardiovascular:  + systolic murmur, + irregularly irregular (rate controlled )


Abdomen:  normal bowel sounds, non tender, soft


Extremities:  no pedal edema, no calf tenderness


Neurologic/Psychiatric:  alert, normal mood/affect, oriented x 3


Skin:  normal color, warm/dry, no rash


 (Meena Don, SEVERO)





Laboratory Results





Last 24 Hours








Test


  1/2/18


14:00 1/2/18


14:32 1/2/18


14:38 1/3/18


05:05


 


White Blood Count 7.66 K/uL    8.14 K/uL 


 


Red Blood Count 4.07 M/uL    4.04 M/uL 


 


Hemoglobin 12.5 g/dL    12.1 g/dL 


 


Hematocrit 37.3 %    36.6 % 


 


Mean Corpuscular Volume 91.6 fL    90.6 fL 


 


Mean Corpuscular Hemoglobin 30.7 pg    30.0 pg 


 


Mean Corpuscular Hemoglobin


Concent 33.5 g/dl 


  


  


  33.1 g/dl 


 


 


Platelet Count 182 K/uL    155 K/uL 


 


Mean Platelet Volume 10.5 fL    10.3 fL 


 


Neutrophils (%) (Auto) 59.2 %    62.2 % 


 


Lymphocytes (%) (Auto) 23.9 %    21.0 % 


 


Monocytes (%) (Auto) 12.0 %    11.5 % 


 


Eosinophils (%) (Auto) 2.0 %    2.1 % 


 


Basophils (%) (Auto) 1.2 %    0.9 % 


 


Neutrophils # (Auto) 4.54 K/uL    5.06 K/uL 


 


Lymphocytes # (Auto) 1.83 K/uL    1.71 K/uL 


 


Monocytes # (Auto) 0.92 K/uL    0.94 K/uL 


 


Eosinophils # (Auto) 0.15 K/uL    0.17 K/uL 


 


Basophils # (Auto) 0.09 K/uL    0.07 K/uL 


 


RDW Standard Deviation 47.4 fL    45.8 fL 


 


RDW Coefficient of Variation 14.1 %    13.9 % 


 


Immature Granulocyte % (Auto) 1.7 %    2.3 % 


 


Immature Granulocyte # (Auto) 0.13 K/uL    0.19 K/uL 


 


Prothrombin Time 41.3 SECONDS    38.1 SECONDS 


 


Prothromb Time International


Ratio 4.0 


  


  


  3.7 


 


 


Activated Partial


Thromboplast Time 42.9 SECONDS 


  


  


  


 


 


Partial Thromboplastin Ratio 1.7    


 


Sodium Level 137 mmol/L    136 mmol/L 


 


Potassium Level 3.5 mmol/L    3.6 mmol/L 


 


Chloride Level 102 mmol/L    102 mmol/L 


 


Carbon Dioxide Level 26 mmol/L    26 mmol/L 


 


Anion Gap 9.0 mmol/L    8.0 mmol/L 


 


Blood Urea Nitrogen 22 mg/dl    21 mg/dl 


 


Creatinine 1.33 mg/dl    1.11 mg/dl 


 


Estimated GFR (


American) 54.5 


  


  


  67.9 


 


 


Estimated GFR (Non-


American 47.1 


  


  


  58.6 


 


 


BUN/Creatinine Ratio 16.3    19.2 


 


Random Glucose 105 mg/dl    106 mg/dl 


 


Calcium Level 9.0 mg/dl    8.6 mg/dl 


 


Total Bilirubin 0.5 mg/dl    0.6 mg/dl 


 


Direct Bilirubin 0.1 mg/dl    


 


Aspartate Amino Transf


(AST/SGOT) 45 U/L 


  


  


  45 U/L 


 


 


Alanine Aminotransferase


(ALT/SGPT) 22 U/L 


  


  


  20 U/L 


 


 


Alkaline Phosphatase 155 U/L    149 U/L 


 


Total Creatine Kinase 96 U/L    74 U/L 


 


Creatine Kinase MB 3.3 ng/ml    


 


Creatine Kinase MB Ratio 3.4     


 


Pro-B-Type Natriuretic Peptide 3504 pg/ml    


 


Total Protein 6.0 gm/dl    5.7 gm/dl 


 


Albumin 2.8 gm/dl    2.6 gm/dl 


 


Lipase 107 U/L    


 


Bedside Troponin I   0.030 ng/ml  


 


Nucleated RBC Absolute Count


(auto) 


  


  


  0.02 K/uL 


 


 


Nucleated Red Blood Cells %    0.2 % 


 


Est Creatinine Clear Calc


Drug Dose 


  


  


  45.1 ml/min 


 


 


Magnesium Level    1.7 mg/dl 


 


Troponin I    0.025 ng/ml 


 


Globulin    3.1 gm/dl 


 


Albumin/Globulin Ratio    0.8 


 


Test


  1/3/18


11:37 


  


  


 


 


Total Creatine Kinase 89 U/L    


 


Troponin I 0.034 ng/ml    








 (Meena Don, PA-C)





Assessment and Plan


89 years old man, with past medical history of chronic systolic congestive 

heart failure, atrial fibrillation, prostate cancer, dyslipidemia and Mantle 

cell lymphoma, who presented to the ED with substernal chest pain started after 

exertion. 





Chest pain w/ exertion- ACS r/o:


- Admitted to tele for cardiac monitoring- a.fib w/ rates low/mid 100s


- Trending cardiac enzymes- negative 


- EKG w/out ischemic changes 


- Cardiology consulted, appreciate recommendations


   -- Believe tachycardia causes symptoms  


   -- Start Amiodarone 200 mg BID for better rate control- decrease to once 

daily in 1 month 





Hypomagnesemia: IV Mag 1 gm x1 dose- follow mag level and replace PRN 





A.fib, chronic systolic CHF w/ EF of 30-35%, HLD- follows w/ [facility] 

cardiology:


- IV Lasix 40 mg x1 dose today per cardiology- monitor I&Os and daily weights


- Continue Lopressor 12.5 mg BID, ASA 81 mg daily, Amiodarone, Lipitor 10 mg 

daily  


- Coumadin held due to supratherapeutic INR- 3.7 today- continue to follow INR  





h/o prostate cancer: Casodex 50 mg QAM 





h/o mantle cell lymphoma- noted 





GERD, GI prophylaxis: Zantac 150 mg BID 





DVT prophylaxis: Coumadin 





Code Status: LEVEL I, FULL 





Dispo: From [facility]- PT/OT and CM consulted- likely discharge 

within the next 1-2 days 


 (Meena Don ., PAJUAN)


Attending Attestation:


Pt seen/examined, chart reviewed, care plan d/w YINKA Don.


I agree w/ the tovar components of her documentation.





Pt denied any dyspnea or chest pain during my visit.


Denied any PENA. 


Tele reviewed - a. fib, with rates >100 at times. 





VSS, no fever


gen - nad


neck - no JVD during my visit


heart - irregular, s1, s2, 2/6 systolic murmur LSB


lungs - CTA b/l


abd - soft, NT


ext - no edema 





A/P:


1.  chest pain - serial troponins negative.  Due to a. fib with RVR?  non-

cardiac (e.g. GI)?  other?


cont to monitor


2.  a. fib with RVR - cardiology has added amiodarone 200mg BID for rhythm/rate 

control. 


Could consider increase in BB if blood pressure allows.  


Cont daily INR checks; coumadin on hold for now due to supratherapeutic INR. 


3.  acute/chronic systolic CHF - additional lasix given today although by the 

time of my visit he was laying flat in the bed and was w/o JVD.  


Monitor; repeat BMP/mag in AM. 





Florian River MD


 (Florian River MD)

## 2018-01-03 NOTE — CARDIOLOGY CONSULTATION REPORT
DATE OF CONSULTATION:  2018

 

DATE OF CONSULTATION:  2018

 

REASON FOR CONSULTATION:  

1.  Burning chest pain with negative cardiac enzymes thus far.  

2.  Chronic systolic CHF.  

3.  Chronic cardiomyopathy.  

 

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

male with a history of Dyslipidemia, Cardiomyopathy (LVEF approximately 40%),

Paroxysmal Atrial Fibrillation (more recently it appears to be Persistent AF),

Frequent PVCs, Thrombocytopenia, Prostate Cancer, history of DVT, and a

history of mantle cell lymphoma who was admitted acutely on 2018

complaining of a burning chest pain which began after exertion earlier in the

day.  It came on and lasted for several hours, and eventually resolved.  He

denied any associated nausea, vomiting, diaphoresis, or dyspnea.  Symptoms

began to come back which prompted his visit to the Emergency Room.  In the ER

he was noted to be in atrial fibrillation with an elevated ventricular

response rate, and also had an elevated ProBNP.  His troponin I level is

negative thus far, and he is currently asymptomatic from a cardiac

standpoint.  

 

The patient specifically denies any neck, jaw, back or arm pain.  He denies

any shortness of breath, unusual dyspnea on exertion, but his body weight is

up above his "trigger weight".  He denies any orthopnea or PND.  He has not

had any syncope or near syncope.  

 

On telemetry monitoring the patient does not appear to have good control of

his ventricular response rate.  His heart rates have been predominantly in

the 110 to 120 range while at a state of rest.  

 

Interestingly, the patient was admitted with substernal chest discomfort and

Afib on 2017.  His cardiac enzymes remained negative at that time, and

his EKG showed no acute changes although the patient was in atrial

fibrillation.  The patient offers no other complaints.  

 

The patient has been therapeutic on his INR over the past month, and is

currently supratherapeutic.  He has not had any signs or symptoms of stroke

or mini-stroke.  

 

MEDICATIONS:  Aspirin 81 mg daily, Casodex 50 mg daily, Neurontin 300 mg

b.i.d., Lopressor 12.5 mg p.o. b.i.d., multivitamin daily, Zantac 150 mg

b.i.d., Lipitor 10 mg daily, Tylenol p.r.n., Maalox p.r.n., milk of magnesia

p.r.n., Ambien 5 mg at bedtime p.r.n. for sleep, Zofran 4 mg IV q. 6 hours

p.r.n. for nausea, nitroglycerin p.r.n., morphine sulfate 2 mg IV q. 30

minutes p.r.n., MiraLax 17 grams daily as needed for constipation, normal

saline solution at 35 mL per hour.  

 

ALLERGIES:  Acetazolamide.  

 

PAST MEDICAL HISTORY:  

1.  Paroxysmal, now more Persistent Atrial fibrillation.

2.  Telemetry would suggest that his V rate is not well controlled.

3.  Prostate cancer on chronic Casodex therapy.

4.  Chronic Cardiomyopathy with an LVEF of approximately 40% on recent echo.

5.  Chronic Systolic CHF.  

6.  Chronic venous insufficiency.  

7.  Depression.

8.  History of diverticulosis.  

9.  Dyslipidemia.  

10.  History of malignant melanoma of the skin.  

11.  Mantle cell lymphoma.  

12.  Heart murmur secondary to MR.

13.  History of PVCs.  

14.  Thrombocytopenia.

15.  History of PVCs.  

16.  History of appendectomy.  

17.  Status post colonoscopy.  

18.  History of radical perineal prostatectomy with lymph node biopsy.  

 

FAMILY HISTORY:  Significant for colon cancer, diabetes mellitus, and heart

disease.  

 

SOCIAL HISTORY:  The patient is retired professor at [employer]. 

He is , drinks alcohol socially, life-long nonsmoker.  

 

PHYSICAL EXAMINATION: 

VITAL SIGNS:  Temperature 36.8?C orally, apical right is approximately

110-115 beats per minute and irregularly irregular, peripheral pulse rate is

106 beats per minute and irregularly irregular, respiration rate 16 and

unlabored, blood pressure 159/94, SPO2 is 93% on room air.  

GENERAL:  The patient appears in no acute distress.  

HEAD, EYES, EARS, NOSE, AND THROAT:  Head is atraumatic, normocephalic.  EOM

intact.  Sclera anicteric.  Faces symmetric.  No perioral cyanosis. Mucous

membranes moist.  

NECK:  JVD, jugular venous pressure is mildly elevated sitting upright. 

Carotid upstrokes +2 bilaterally without bruits.    

CHEST AND LUNGS:  Are with mildly diminished breath sounds in bilateral

bases, otherwise clear.  No obvious wheezes, rales, or rhonchi.  

CARDIOVASCULAR SYSTEM: S1 and S2 are irregularly irregular and tachycardic. 

There is a grade 2/6 apical holosystolic murmur noted.  No diastolic murmurs.

 No obvious gallops or rubs.  PMI is nondisplaced.  No lifts, heaves, or

thrills.  No abdominal, aortic or renal bruits.  

ABDOMINAL EXAMINATION:  Bowel sounds present.  

EXTREMITIES:  Are without clubbing or cyanosis.  He has +1 pitting edema to

the mid tibia bilaterally.  Intact posterior tibial and radial pulses.    

NEUROLOGIC EXAMINATION:  Patient is awake, alert and oriented, pleasant and

cooperative.  Answers questions appropriately.  Speech is clear.  Normal

movement in all 4 extremities.  

 

Echocardiogram 2017 shows the followin.  Normal LV size with mild concentric LVH.

2.  Mild global LV systolic dysfunction.

3.  LVEF 40%-45%.  

4.  Grossly normal RV size and systolic function.  

5.  Mild to moderate AI.  

6.  Mild mitral regurgitation.  

7.  Mild PI.  

8.  Aortic valve sclerosis without stenosis.  

9.  Trace TR.  

10.  Mild pulmonary hypertension with an estimated PASP of 45-45 mmHg.  

 

LABORATORY DATA:  White blood cell count is 8.14, hemoglobin 12.1 g/dL,

hematocrit 36.6%, and platelet count is 155,000.  INR is supratherapeutic at

3.7.  Sodium is 136 mmol/L, potassium 3.6 mmol/L, BUN 21 mg/dL, creatinine

1.11 mg/dL.  Random glucose 106 mg/dL.  Magnesium is slightly low at 1.7

mg/dL.  Total CK is 74 with a CK-MB of 3.3.  Troponin I levels are 0.025 and

0.030 ng/mL.  ProBNP is elevated at 3,504 pg/mL. 

 

Chest x-ray on admission shows no acute cardiopulmonary findings.  

 

ASSESSMENT:  

1. Burning chest discomfort with negative cardiac enzymes.  

2.  Persistent Atrial Fibrillation, telemetry would suggest that his

ventricular response rate is not well controlled.  

3.  Mild Acute Exacerbation of Chronic Systolic CHF.  

4.  Cardiomyopathy.

5.  Diagnoses as mentioned above.  

 

PLAN:  

1.  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  

2.  He will be given a single injection of Lasix 40 mg IV now, and closely

monitor daily I&Os, body weights.  

3.  The patient has been hospitalized twice in the past 2 weeks, both times

he was in atrial fibrillation, suspect that his ventricular response rate is

not well controlled and that may be contributing to his recurrent

hospitalizations and symptoms.  

4.  Therefore recommend beginning Amiodarone 200 mg b.i.d. and decreasing to

once daily in approximately 1 month.  

5.  Continue to monitor daily INR.  

6.  He did receive IV magnesium sulfate earlier today which should correct

his hypomagnesemia.  

7.  If the patient does not convert with the use of Amiodarone -- could

consider an electrical cardioversion down the road in 3-4 weeks.  

8.  We will continue to follow long while hospitalized.

 

 

 

RACQUEL

## 2018-01-04 VITALS
TEMPERATURE: 97.52 F | HEART RATE: 86 BPM | OXYGEN SATURATION: 97 % | DIASTOLIC BLOOD PRESSURE: 90 MMHG | SYSTOLIC BLOOD PRESSURE: 148 MMHG

## 2018-01-04 VITALS
HEART RATE: 92 BPM | DIASTOLIC BLOOD PRESSURE: 89 MMHG | TEMPERATURE: 98.42 F | SYSTOLIC BLOOD PRESSURE: 155 MMHG | OXYGEN SATURATION: 93 %

## 2018-01-04 VITALS
HEART RATE: 81 BPM | OXYGEN SATURATION: 90 % | SYSTOLIC BLOOD PRESSURE: 138 MMHG | DIASTOLIC BLOOD PRESSURE: 82 MMHG | TEMPERATURE: 97.88 F

## 2018-01-04 VITALS — OXYGEN SATURATION: 93 %

## 2018-01-04 VITALS — OXYGEN SATURATION: 90 %

## 2018-01-04 VITALS — SYSTOLIC BLOOD PRESSURE: 138 MMHG | DIASTOLIC BLOOD PRESSURE: 82 MMHG

## 2018-01-04 LAB
BUN SERPL-MCNC: 21 MG/DL (ref 7–18)
CALCIUM SERPL-MCNC: 8.8 MG/DL (ref 8.5–10.1)
CO2 SERPL-SCNC: 31 MMOL/L (ref 21–32)
CREAT SERPL-MCNC: 1.18 MG/DL (ref 0.6–1.4)
GLUCOSE SERPL-MCNC: 92 MG/DL (ref 70–99)
INR PPP: 2.6 (ref 0.9–1.1)
POTASSIUM SERPL-SCNC: 3.7 MMOL/L (ref 3.5–5.1)
SODIUM SERPL-SCNC: 137 MMOL/L (ref 136–145)

## 2018-01-04 RX ADMIN — Medication SCH TAB: at 08:05

## 2018-01-04 RX ADMIN — GABAPENTIN SCH MG: 300 CAPSULE ORAL at 08:05

## 2018-01-04 RX ADMIN — RANITIDINE SCH MG: 150 TABLET ORAL at 08:05

## 2018-01-04 RX ADMIN — Medication SCH MG: at 08:05

## 2018-01-04 RX ADMIN — BICALUTAMIDE SCH MG: 50 TABLET ORAL at 08:05

## 2018-01-04 RX ADMIN — AMIODARONE HYDROCHLORIDE SCH MG: 200 TABLET ORAL at 08:05

## 2018-01-04 RX ADMIN — METOPROLOL TARTRATE SCH MG: 25 TABLET, FILM COATED ORAL at 08:05

## 2018-01-04 RX ADMIN — ATORVASTATIN CALCIUM SCH MG: 10 TABLET, FILM COATED ORAL at 08:05

## 2018-01-04 NOTE — DISCHARGE INSTRUCTIONS
Discharge Instructions


Date of Service


Jan 4, 2018.





Admission


Reason for Admission:  Precordial Chest Pain





Discharge


Discharge Diagnosis / Problem:  Precordial chest pain





Discharge Goals


Goal(s):  Decrease discomfort, Improve disease control, Learn about illness, 

Diagnostic testing, Therapeutic intervention, Prevent Disease Progression





Activity Recommendations


Activity Limitations:  resume your previous activity





.





Instructions / Follow-Up


Instructions / Follow-Up


New medication:


   Amiodarone 200 mg twice daily- this is a new medication you were started on 

by cardiology for better heart rate control





Resume all other regular home medications 





Continue routine INR checks as you have been 





FOLLOW-UPS:





Please follow-up with your PCP within 5-7 days





Please follow-up with Cardiology within 1 month 





Please follow-up/keep all of your subspecialty appointments





Current Hospital Diet


Patient's current hospital diet: AHA Diet (Heart Healthy)





Discharge Diet


Recommended Diet:  AHA Diet (Heart Healthy)





Pending Studies


Studies pending at discharge:  no





Laboratory Results





Hemoglobin A1c








Test


  12/27/17


19:14 Range/Units


 


 


Estimated Average Glucose 117   mg/dl


 


Hemoglobin A1c 5.7 H 4.5-5.6  %








Lipid Panel








Test


  12/27/17


19:14 Range/Units


 


 


Triglycerides Level 85  0-150  mg/dl


 


Cholesterol Level 164  0-200  mg/dl


 


HDL Cholesterol 39   mg/dl


 


Cholesterol/HDL Ratio 4.2   


 


LDL Cholesterol, Calculated 108   mg/dl











Medical Emergencies








.


Who to Call and When:





Medical Emergencies:  If at any time you feel your situation is an emergency, 

please call 911 immediately.





.





Non-Emergent Contact


Non-Emergency issues call your:  Primary Care Provider





.


.








"Provider Documentation" section prepared by Meena Don.








.





VTE Core Measure


Inpt VTE Proph given/why not?:  Warfarin (Coumadin), T.E.D. Stockings, SCD's

## 2018-01-04 NOTE — CARDIOLOGY PROGRESS NOTE
DATE: 01/04/2018

 

SUBJECTIVE:  Mr. Bradford is resting comfortably in bed without complaints of

chest pain or dyspnea.  Does have occasional left-sided low back pain. 

Denies palpitations.

 

OBJECTIVE:

VITAL SIGNS:  Blood pressure is 155/89 with an irregular pulse of 82. 

Respiratory rate is 18.  The patient is afebrile at 36.9 degrees Celsius with

saturation 93% on room air.

NECK:  Supple with full carotid upstrokes.  No obvious bruits.  Jugular

venous pressure is flat at 90 degrees.  There is no thyromegaly.

CARDIOVASCULAR:  Reveals an irregularly irregular rhythm with distant heart

sounds.  A 1/6 apical holosystolic murmur is noted.

LUNGS:  Clear without rales, rhonchi, or wheezes.

ABDOMEN:  Soft without bruits.

EXTREMITIES:  Reveal intact radial artery pulses bilaterally.  There is no

peripheral edema.

 

LABORATORY DATA:  Electrolytes sowed sodium of 137, potassium 3.7, chloride

98, bicarb 31, BUN 21, creatinine 1.18, glucose 92.  Three troponin I levels

are normal.  Telemetry monitor showed no evidence of atrial fibrillation with

a controlled ventricular response.

 

IMPRESSION AND PLAN:

1.  Chest pain syndrome -- has resolved.  Not likely ischemic in etiology as

troponins are all normal.  No further workup indicated at this time.

2.  Atrial fibrillation -- ventricular response now better controlled with

the addition of amiodarone at 200 mg b.i.d.  We will continue at this dose

for 1 month, then decrease it daily.  Could consider electrical cardioversion

in approximately 3-4 weeks.

3.  Chronic systolic congestive failure -- received 1 dose of intravenous

Lasix yesterday and has lost 2.6 kilograms.  Able to sleep supine suggesting

euvolemia.

4.  Mild cardiomyopathy -- ejection fraction of 40-45% on echocardiogram

performed in December 2017.

5.  Mild to moderate aortic insufficiency.

## 2018-01-04 NOTE — DISCHARGE SUMMARY
Discharge Summary


Date of Service


Jan 4, 2018.





Discharge Summary


Admission Date:


Jan 2, 2018 at 23:12


Discharge Date:  Jan 4, 2018


Discharge Disposition:  Personal care


Principal Diagnosis:  Precordial chest pain


Problems/Secondary Diagnoses:


Chest pain w/ exertion


Hypomagnesemia


A.fib


acute on chronic systolic CHF w/ EF of 30-35%


HLD 


h/o prostate cancer


h/o mantle cell lymphoma


GERD


Immunizations:  


   Have You Had Influenza Vaccine:  Unknown


   History of Tetanus Vaccine?:  utd


   History of Pneumococcal:  Unknown


   History of Hepatitis B Vaccine:  No


Procedures:


CHEST ONE VIEW PORTABLE





CLINICAL HISTORY: Chest pain.    





COMPARISON STUDY:  PET/CT December 13, 2017 and chest radiograph December 27, 2017.





FINDINGS: Lung volumes are normal. Mild left basilar opacity suggests


atelectasis. Note is made of moderate cardiomegaly without evidence of pulmonary


edema. There is no consolidation to suggest pneumonia. There are multiple old


bilateral rib fractures. No pneumothorax or pleural effusion is present.





IMPRESSION:  No acute cardiopulmonary findings. 














Electronically signed by:  Aleksander Hoffmann M.D.


1/2/2018 2:49 PM





Dictated Date/Time:  1/2/2018 2:48 PM





 The status of this report is Signed.   


 Draft = Not yet reviewed or approved by Radiologist.  


 Signed = Reviewed and approved by Radiologist.


Consultations:


Cardiology





Medication Reconciliation


New Medications:  


Amiodarone HCl (Amiodarone HCl) 200 Mg Tab


200 MG PO BID for 30 Days, #60 TAB





 


Continued Medications:  


Aspirin (Aspirin EC Low Dose) 81 Mg Ectab


81 MG PO QAM for 30 Days





Bicalutamide (Casodex) 50 Mg Tab


50 MG PO QAM, TAB





Calcium Carbonate-Vitamin D (Oyster Shell Calcium/D3 500-400 mg-Unit) 1 Tab Tab


2 TABS PO DAILY





Furosemide (Furosemide) 20 Mg Tab


20-40 MG PO DAILY PRN for "DEPENDS ON WT GAIN"., #30





Gabapentin (Neurontin) 300 Mg Cap


300 MG PO BID, CAP





Leuprolide Acetate (Lupron Depot) 30 Mg Kit


30 MG IM Q4MO





Metoprolol Tartrate (Lopressor) 25 Mg Tab


12.5 MG PO BID for 30 Days, #30 TAB





Multivitamin (Multivitamin)  Tab


1 TAB PO QAM, TAB





Omega-3 Fatty Acids (Fish Oil 1200 mg) 1 Cap Cap


1200 MG PO DAILY





Ranitidine HCl (Ranitidine HCl) 150 Mg Tab


150 MG PO BID, #30 TAB





Warfarin Sodium (Coumadin) 5 Mg Tab


1 TAB PO 6XWK for 90 Days, TAB 1 Refill


EVERY DAY EXCEPT MONDAYS.


Warfarin Sodium (Warfarin Sodium) 5 Mg Tab


7.5 MG PO WK for 90 Days, TAB 3 Refills


TAKES ON MONDAYS ONLY








Discharge Exam


Review of Systems:  


   Constitutional:  No fever, No chills, No sweats, No weakness, No fatigue


   Eyes:  No worsening of vision


   ENT:  No hearing loss


   Respiratory:  No cough, No shortness of breath, No hemoptysis


   Cardiovascular:  No chest pain, No edema, No palpitations


   Abdomen:  No pain, No nausea, No vomiting, No diarrhea, No constipation


   Musculoskeletal:  No joint pain, No muscle pain, No swelling, No calf pain


   Genitourinary - Male:  No hematuria, No dysuria


   Neurologic:  No weakness, No numbness/tingling


   Psychiatric:  No depression symptoms, No anxiety


   Endocrine:  No fatigue


   Hematologic / Lymphatic:  No abnormal bleeding/bruising


   Integumentary:  No rash, No itch, No new/changing skin lesions


Physical Exam:  


   General Appearance:  no apparent distress


   Eyes:  normal inspection, PERRL


   ENT:  hearing grossly normal


   Neck:  supple


   Respiratory/Chest:  lungs clear, no respiratory distress, no accessory 

muscle use


   Cardiovascular:  + systolic murmur, + irregularly irregular (rate controlled 

)


   Abdomen / GI:  normal bowel sounds, non tender, soft


   Extremities:  no calf tenderness, no pedal edema


   Neurologic/Psychiatric:  alert, normal mood/affect, oriented x 3


   Skin:  normal color, warm/dry, no rash





Hospital Course


Admission H&P:





89 years old man with past medical history of chronic systolic congestive heart 

failure, atrial fibrillation, prostate cancer, dyslipidemia and Mantle cell 

lymphoma.  Presented to the ED with substernal chest pain started after 

exertion today.


Pain was moderate in intensity, localized in the substernal area.


No associated symptoms


No aggravating or relieving factors.


Pain resolved spontaneously after arriving to ED.


This is his second ED visit for chest pain.


ED physician discussed the case with cardiologist recommended admitting the 

patient to rule out ACS








Physical Exam


Vital Signs











  Date Time  Temp Pulse Resp B/P (MAP) Pulse Ox O2 Delivery O2 Flow Rate FiO2


 


1/2/18 22:36  99      


 


1/2/18 22:30  96 13 129/76 94 Room Air  


 


1/2/18 21:59  103 22 137/77 95 Room Air  


 


1/2/18 21:01    157/93    


 


1/2/18 21:00  96 26     


 


1/2/18 20:30  96 25 155/94 96   


 


1/2/18 20:00  95 25 132/63 93   


 


1/2/18 19:31  90 22 143/79 95 Room Air  


 


1/2/18 18:38  87 19 142/103 96 Room Air  


 


1/2/18 18:28  91      


 


1/2/18 17:59  111 18  95   


 


1/2/18 17:55  88 18  97 Room Air  


 


1/2/18 17:50  92 20  97   


 


1/2/18 17:45  89 20  98   


 


1/2/18 17:40  78 18  98   


 


1/2/18 17:29  84 21 146/82 97 Room Air  


 


1/2/18 16:30  86 18 116/95 97 Room Air  


 


1/2/18 15:30  83 23 133/86 96 Room Air  


 


1/2/18 15:00  83 19 129/64 96 Room Air  


 


1/2/18 14:28  87      


 


1/2/18 14:12     93 Room Air  


 


1/2/18 14:12  82 16 121/68 97 Room Air  


 


1/2/18 13:49 36.7       


 


1/2/18 13:46     96 Room Air  


 


1/2/18 13:36  86 20 126/75 97 Room Air  








General Appearance:  WD/WN, no apparent distress


Head:  normocephalic, atraumatic


Eyes:  normal inspection, EOMI


ENT:  normal ENT inspection, hearing grossly normal


Neck:  supple


Respiratory/Chest:  chest non-tender, lungs clear, normal breath sounds, no 

respiratory distress, no accessory muscle use


Cardiovascular:  regular rate, rhythm, no edema, no gallop, no JVD, no murmur, 

normal peripheral pulses


Abdomen/GI:  normal bowel sounds, non tender, soft, no organomegaly, no 

pulsatile mass


Back:  normal inspection


Extremities/Musculoskelatal:  normal inspection, no calf tenderness, normal 

capillary refill, no pedal edema, normal range of motion


Neurologic/Psych:  CNs II-XII nml as tested, no motor/sensory deficits, alert, 

normal mood/affect, normal reflexes, oriented x 3


Skin:  normal color, warm/dry, no rash





Hospital Course:





89 years old man, with past medical history of chronic systolic congestive 

heart failure, atrial fibrillation, prostate cancer, dyslipidemia and Mantle 

cell lymphoma, who presented to the ED with substernal chest pain started after 

exertion. 





Chest pain w/ exertion- ACS r/o:


- Admitted to [facility] for cardiac monitoring- a.fib w/ rates 80-90s


- Trending cardiac enzymes- negative 


- EKG w/out ischemic changes 


- Cardiology consulted, appreciate recommendations


   -- Believe tachycardia causes symptoms  


   -- Start Amiodarone 200 mg BID for better rate control- decrease to once 

daily in 1 month 





Hypomagnesemia- RESOLVED: IV Mag 1 gm x1 dose- follow mag level and replace PRN 





A.fib- rate controlled, acute on chronic systolic CHF w/ EF of 30-35%, HLD- 

follows w/ [place] cardiology:


- IV Lasix 40 mg x1 dose today per cardiology- monitor I&Os and daily weights- 

resume Lasix PO PRN at discharge 


- Continue Lopressor 12.5 mg BID, ASA 81 mg daily, Amiodarone


- Lipitor 10 mg daily started at admission due to ?ACS- recommend patient 

discuss statin therapy w/ cardiology/PCP 


- Coumadin held due to supratherapeutic INR- 2.6 today- resume Coumadin at 

discharge and continue INR f/u's 





h/o prostate cancer: Casodex 50 mg QAM 





h/o mantle cell lymphoma- noted 





GERD, GI prophylaxis: Zantac 150 mg BID 





DVT prophylaxis: Coumadin 





Code Status: LEVEL I, FULL 





Dispo: Discharge to [facility]





PA Physician Supervision Note:





I interviewed and examined the patient. Discussed with Meena COLVIN and 

agree with findings and plan as documented in the note. Any exceptions or 

clarifications are listed here: None





Patient admitted with rate-related chest pain from atrial fibrillation 

initiated on amiodarone therapy to be twice a day for one month and daily after 

that patient is in good condition on the day of his discharge she is not short 

of breath having no chest pain ambulating the halls





Vitals are reviewed and stable cardiac exam sounds regular today his lungs are 

clear with good air movement





-related chest pain due to A. fib and tachycardia controlled with amiodarone 

dosing to go home on the same plus formal anticoagulation with warfarin with 

expectation to reduce to daily dosing from twice a day dosing in 1 month's time 

sent out on 200 amiodarone twice a day





Documented By:  Jose Miguel Martin


Total Time Spent:  Greater than 30 minutes


This includes examination of the patient, discharge planning, medication 

reconciliation, and communication with other providers.





Discharge Instructions


Please refer to the electronic Patient Visit Report (Discharge Instructions) 

for additional information.





Follow-Up


Please follow-up with your PCP within 5-7 days





Please follow-up with Cardiology within 1 month 





Please follow-up/keep all of your subspecialty appointments





Additional Copies To


Oliver Chen M.D.

## 2018-01-05 ENCOUNTER — HOSPITAL ENCOUNTER (OUTPATIENT)
Dept: HOSPITAL 45 - C.LABVPSUA | Age: 83
Discharge: HOME | End: 2018-01-05
Attending: INTERNAL MEDICINE
Payer: COMMERCIAL

## 2018-01-05 ENCOUNTER — HOSPITAL ENCOUNTER (EMERGENCY)
Dept: HOSPITAL 45 - C.EDB | Age: 83
Discharge: HOME | End: 2018-01-05
Payer: COMMERCIAL

## 2018-01-05 VITALS — SYSTOLIC BLOOD PRESSURE: 161 MMHG | HEART RATE: 91 BPM | OXYGEN SATURATION: 94 % | DIASTOLIC BLOOD PRESSURE: 78 MMHG

## 2018-01-05 VITALS
WEIGHT: 173.28 LBS | HEIGHT: 69.02 IN | HEIGHT: 69.02 IN | BODY MASS INDEX: 25.67 KG/M2 | WEIGHT: 173.28 LBS | BODY MASS INDEX: 25.67 KG/M2

## 2018-01-05 VITALS — TEMPERATURE: 97.7 F

## 2018-01-05 VITALS — OXYGEN SATURATION: 94 %

## 2018-01-05 DIAGNOSIS — I50.9: ICD-10-CM

## 2018-01-05 DIAGNOSIS — C83.10: ICD-10-CM

## 2018-01-05 DIAGNOSIS — Z85.46: ICD-10-CM

## 2018-01-05 DIAGNOSIS — Z79.01: ICD-10-CM

## 2018-01-05 DIAGNOSIS — Z83.3: ICD-10-CM

## 2018-01-05 DIAGNOSIS — M54.9: Primary | ICD-10-CM

## 2018-01-05 DIAGNOSIS — R07.2: Primary | ICD-10-CM

## 2018-01-05 DIAGNOSIS — A69.20: ICD-10-CM

## 2018-01-05 DIAGNOSIS — M54.9: ICD-10-CM

## 2018-01-05 DIAGNOSIS — Z82.49: ICD-10-CM

## 2018-01-05 DIAGNOSIS — R34: ICD-10-CM

## 2018-01-05 DIAGNOSIS — Z79.82: ICD-10-CM

## 2018-01-05 DIAGNOSIS — C85.90: ICD-10-CM

## 2018-01-05 DIAGNOSIS — I48.91: ICD-10-CM

## 2018-01-05 LAB
ALBUMIN SERPL-MCNC: 2.8 GM/DL (ref 3.4–5)
ALP SERPL-CCNC: 165 U/L (ref 45–117)
ALT SERPL-CCNC: 23 U/L (ref 12–78)
AST SERPL-CCNC: 65 U/L (ref 15–37)
BASOPHILS # BLD: 0.12 K/UL (ref 0–0.2)
BASOPHILS NFR BLD: 1.4 %
BUN SERPL-MCNC: 20 MG/DL (ref 7–18)
CALCIUM SERPL-MCNC: 9.2 MG/DL (ref 8.5–10.1)
CO2 SERPL-SCNC: 28 MMOL/L (ref 21–32)
CREAT SERPL-MCNC: 1.27 MG/DL (ref 0.6–1.4)
EOS ABS #: 0.12 K/UL (ref 0–0.5)
EOSINOPHIL NFR BLD AUTO: 147 K/UL (ref 130–400)
GLUCOSE SERPL-MCNC: 83 MG/DL (ref 70–99)
HCT VFR BLD CALC: 40.1 % (ref 42–52)
HGB BLD-MCNC: 13.5 G/DL (ref 14–18)
IG#: 0.39 K/UL (ref 0–0.02)
IMM GRANULOCYTES NFR BLD AUTO: 20.3 %
INR PPP: 1.9 (ref 0.9–1.1)
LIPASE: 119 U/L (ref 73–393)
LYMPHOCYTES # BLD: 1.78 K/UL (ref 1.2–3.4)
MCH RBC QN AUTO: 30.6 PG (ref 25–34)
MCHC RBC AUTO-ENTMCNC: 33.7 G/DL (ref 32–36)
MCV RBC AUTO: 90.9 FL (ref 80–100)
MONO ABS #: 0.95 K/UL (ref 0.11–0.59)
MONOCYTES NFR BLD: 10.8 %
NEUT ABS #: 5.43 K/UL (ref 1.4–6.5)
NEUTROPHILS # BLD AUTO: 1.4 %
NEUTROPHILS NFR BLD AUTO: 61.7 %
NRBC BLD AUTO-RTO: 0.8 %
NUCLEATED RED BLOOD CELL ABS: 0.07 K/UL (ref 0–0)
PMV BLD AUTO: 10.7 FL (ref 7.4–10.4)
POTASSIUM SERPL-SCNC: 3.9 MMOL/L (ref 3.5–5.1)
PROT SERPL-MCNC: 6.3 GM/DL (ref 6.4–8.2)
RED CELL DISTRIBUTION WIDTH CV: 14 % (ref 11.5–14.5)
RED CELL DISTRIBUTION WIDTH SD: 46.8 FL (ref 36.4–46.3)
SODIUM SERPL-SCNC: 136 MMOL/L (ref 136–145)
WBC # BLD AUTO: 8.79 K/UL (ref 4.8–10.8)

## 2018-01-05 NOTE — DIAGNOSTIC IMAGING REPORT
CHEST ONE VIEW PORTABLE



CLINICAL HISTORY: CHEST PAIN dyspnea



COMPARISON STUDY:  1/2/2018



FINDINGS: Atelectasis left base. Mild stable cardiomegaly. Moderate

emphysematous change. No focal infiltrate. 



IMPRESSION:  Platelike atelectasis left base. Moderate stable cardiomegaly.

Emphysematous change. 











The above report was generated using voice recognition software.  It may contain

grammatical, syntax or spelling errors.









Electronically signed by:  Raj Powell M.D.

1/5/2018 11:03 AM



Dictated Date/Time:  1/5/2018 11:02 AM

## 2018-01-05 NOTE — EMERGENCY ROOM VISIT NOTE
History


Report prepared by [place]:  Giulia Monte


Under the Supervision of:  Dr. Mal Ledezma M.D.


First contact with patient:  10:07


Chief Complaint:  BACK PAIN


Stated Complaint:  SLIGHT TIGHTNESS IN CHEST, PAIN IN RIBS/BACK





History of Present Illness


The patient is a 89 year old male who presents to the Emergency Room with 

complaints of persistent chest pain that began at 0800. The patient states that 

he had a similar episode, that caused him to come to the Emergency Department 

on 01/02/18, noting they found him in atrial fibrillation. He notes he has 

intermittent nausea and some shortness of breath. He denies any vomiting, 

diarrhea, fevers, chills, dizziness, or changes in vision. The patient notes 

that he has been having back pain that began 3-4 days ago.





   Source of History:  patient


   Onset:  0800


   Position:  chest


   Quality:  other (chest pain)


   Timing:  other (persistent)


   Associated Symptoms:  + SOB, + nausea, + back pain, No fevers, No chills, No 

vomiting, No diarrhea





Review of Systems


See HPI for pertinent positives and negatives.  A total of ten systems were 

reviewed and were otherwise negative.





Past Medical & Surgical


Medical Problems:


(1) Atrial fibrillation


(2) CHF (congestive heart failure)


(3) DVT (deep venous thrombosis)


(4) H/O blood clots


(5) Heart disease


(6) Lyme disease


(7) Mantle cell lymphoma


(8) Non-Hodgkin lymphoma


(9) Prostate cancer


(10) Shingles


(11) Substernal discomfort


Surgical Problems:


(1) H/O prostatectomy


(2) S/P appendectomy








Family History





Diabetes mellitus


FH: cancer


FH: heart disease


FH: lung disease


Hypertension





Social History


Smoking Status:  Never Smoker


Alcohol Use:  occasionally


Drug Use:  none


Marital Status:  


Housing Status:  lives alone


Occupation Status:  unemployed





Current/Historical Medications


Scheduled


Amiodarone HCl (Amiodarone HCl), 200 MG PO BID


Aspirin (Aspirin EC Low Dose), 81 MG PO QAM


Bicalutamide (Casodex), 50 MG PO QAM


Calcium Carbonate-Vitamin D (Oyster Shell Calcium/D3 500-400 mg-Unit), 2 TABS 

PO DAILY


Gabapentin (Neurontin), 300 MG PO BID


Leuprolide Acetate (Lupron Depot), 30 MG IM Q4MO


Metoprolol Tartrate (Lopressor), 12.5 MG PO BID


Multivitamin (Multivitamin), 1 TAB PO QAM


Omega-3 Fatty Acids (Fish Oil 1200 mg), 1,200 MG PO DAILY


Ranitidine HCl (Ranitidine HCl), 150 MG PO BID


Warfarin Sodium (Coumadin), 1 TAB PO 6XWK


Warfarin Sodium (Warfarin Sodium), 7.5 MG PO WK





Scheduled PRN


Furosemide (Furosemide), 20-40 MG PO DAILY PRN for "DEPENDS ON WT GAIN".





Allergies


Coded Allergies:  


     Acetazolamide (Verified  Allergy, Unknown, Diamox Eye Drops ? rxn, 1/2/18)





Physical Exam


Vital Signs











  Date Time  Temp Pulse Resp B/P (MAP) Pulse Ox O2 Delivery O2 Flow Rate FiO2


 


1/5/18 13:58  91 18 161/78 94   


 


1/5/18 13:12  94      


 


1/5/18 12:30  91 18 154/89  Room Air  


 


1/5/18 11:32 36.5 128 18 150/116 95 Room Air  


 


1/5/18 10:58 36.5 90 18 144/88 95 Room Air  


 


1/5/18 10:46 36.5 92 18 160/96 91 Room Air  


 


1/5/18 10:21     94 Room Air  


 


1/5/18 10:15  92      


 


1/5/18 10:02 36.5 96 20 141/82 100 Room Air  











Physical Exam


GENERAL: Awake, alert, in no distress


HENT: Dry mucous membranes. Normocephalic, atraumatic. Oropharynx unremarkable.


EYES: Normal conjunctiva. Sclera non-icteric.


NECK: Supple. No nuchal rigidity. FROM. No JVD.


RESPIRATORY: Irregularly irregular rate. Clear to auscultation.


CARDIAC: Regular rate, normal rhythm. Extremities warm and well perfused. 

Pulses equal.


ABDOMEN: Soft, non-distended. No tenderness to palpation. No rebound or 

guarding. No masses.


RECTAL: Deferred.


MUSCULOSKELETAL: Chest examination reveals no tenderness. The back is 

symmetrical on inspection without obvious abnormality. Mild left upper 

paraspinal ttp.There is no CVA tenderness to palpation. No joint edema. 


LOWER EXTREMITIES: Calves are equal size bilaterally and non-tender. No edema. 

No discoloration. 


NEURO: Normal sensorium. No sensory or motor deficits noted. 


SKIN: No rash or jaundice noted.





Medical Decision & Procedures


ER Provider


Diagnostic Interpretation:


Radiology results as stated below per my review and radiologist interpretation: 





CHEST ONE VIEW PORTABLE





CLINICAL HISTORY: CHEST PAIN dyspnea





COMPARISON STUDY:  1/2/2018





FINDINGS: Atelectasis left base. Mild stable cardiomegaly. Moderate


emphysematous change. No focal infiltrate. 





IMPRESSION:  Platelike atelectasis left base. Moderate stable cardiomegaly.


Emphysematous change. 





The above report was generated using voice recognition software.  It may contain


grammatical, syntax or spelling errors.





Electronically signed by:  Raj Powell M.D.


1/5/2018 11:03 AM





Dictated Date/Time:  1/5/2018 11:02 AM





Laboratory Results


1/5/18 10:40








Red Blood Count 4.41, Mean Corpuscular Volume 90.9, Mean Corpuscular Hemoglobin 

30.6, Mean Corpuscular Hemoglobin Concent 33.7, Mean Platelet Volume 10.7, 

Neutrophils (%) (Auto) 61.7, Lymphocytes (%) (Auto) 20.3, Monocytes (%) (Auto) 

10.8, Eosinophils (%) (Auto) 1.4, Basophils (%) (Auto) 1.4, Neutrophils # (Auto

) 5.43, Lymphocytes # (Auto) 1.78, Monocytes # (Auto) 0.95, Eosinophils # (Auto

) 0.12, Basophils # (Auto) 0.12





1/5/18 10:40

















Test


  1/5/18


10:40 1/5/18


12:45


 


White Blood Count


  8.79 K/uL


(4.8-10.8) 


 


 


Red Blood Count


  4.41 M/uL


(4.7-6.1) 


 


 


Hemoglobin


  13.5 g/dL


(14.0-18.0) 


 


 


Hematocrit 40.1 % (42-52)  


 


Mean Corpuscular Volume


  90.9 fL


() 


 


 


Mean Corpuscular Hemoglobin


  30.6 pg


(25-34) 


 


 


Mean Corpuscular Hemoglobin


Concent 33.7 g/dl


(32-36) 


 


 


Platelet Count


  147 K/uL


(130-400) 


 


 


Mean Platelet Volume


  10.7 fL


(7.4-10.4) 


 


 


Neutrophils (%) (Auto) 61.7 %  


 


Lymphocytes (%) (Auto) 20.3 %  


 


Monocytes (%) (Auto) 10.8 %  


 


Eosinophils (%) (Auto) 1.4 %  


 


Basophils (%) (Auto) 1.4 %  


 


Neutrophils # (Auto)


  5.43 K/uL


(1.4-6.5) 


 


 


Lymphocytes # (Auto)


  1.78 K/uL


(1.2-3.4) 


 


 


Monocytes # (Auto)


  0.95 K/uL


(0.11-0.59) 


 


 


Eosinophils # (Auto)


  0.12 K/uL


(0-0.5) 


 


 


Basophils # (Auto)


  0.12 K/uL


(0-0.2) 


 


 


RDW Standard Deviation


  46.8 fL


(36.4-46.3) 


 


 


RDW Coefficient of Variation


  14.0 %


(11.5-14.5) 


 


 


Immature Granulocyte % (Auto) 4.4 %  


 


Immature Granulocyte # (Auto)


  0.39 K/uL


(0.00-0.02) 


 


 


Nucleated RBC Absolute Count


(auto) 0.07 K/uL


(0-0) 


 


 


Nucleated Red Blood Cells % 0.8 %  


 


Prothrombin Time


  19.4 SECONDS


(9.0-12.0) 


 


 


Prothromb Time International


Ratio 1.9 (0.9-1.1) 


  


 


 


Anion Gap


  11.0 mmol/L


(3-11) 


 


 


Est Creatinine Clear Calc


Drug Dose 39.5 ml/min 


  


 


 


Estimated GFR (


American) 57.7 


  


 


 


Estimated GFR (Non-


American 49.8 


  


 


 


BUN/Creatinine Ratio 16.1 (10-20)  


 


Calcium Level


  9.2 mg/dl


(8.5-10.1) 


 


 


Total Bilirubin


  0.9 mg/dl


(0.2-1) 


 


 


Direct Bilirubin


  0.3 mg/dl


(0-0.2) 


 


 


Aspartate Amino Transf


(AST/SGOT) 65 U/L (15-37) 


  


 


 


Alanine Aminotransferase


(ALT/SGPT) 23 U/L (12-78) 


  


 


 


Alkaline Phosphatase


  165 U/L


() 


 


 


Pro-B-Type Natriuretic Peptide


  5122 pg/ml


(0-1800) 


 


 


Total Protein


  6.3 gm/dl


(6.4-8.2) 


 


 


Albumin


  2.8 gm/dl


(3.4-5.0) 


 


 


Lipase


  119 U/L


() 


 


 


Troponin I


  


  0.016 ng/ml


(0-0.045)





Laboratory results reviewed by me





ECG


Indication:  chest pain


Rate (beats per minute):  100


Rhythm:  atrial fibrillation


Findings:  PVC, no acute ischemic change, left axis deviation





ED Course


1019: The patient was evaluated in room A4. A complete history and physical 

exam was performed.





1244: I reevaluated the patient, who was resting comfortably. The patient will 

be receiving a second troponin, if okay then the patient will see his doctor as 

scheduled on Monday. 





1342: The patients troponin was normal. I updated him on test findings and the 

treatment plan, he verbalized complete understanding and agreement. The patient 

was discharged home.





Medical Decision


I reviewed the patient's past medical history, medications, and the nursing 

notes as described above.





The patient's presentation and history were concerning for atrial fibrillation, 

arythmia, pneumonia, bronchitis, ACS, CHF, and gastritis. 





The patient is an 89-year-old gentleman presents emergency Department with 

constant back pain for the past week and then chest pain with nausea that began 

this morning around 8:00 in the setting of being admitted and discharged 

yesterday for similar symptoms, found to be related to his A. fib per hpi.  On 

arrival the patient is in no acute distress, afebrile with stable vital signs.  

A. fib rate controlled on EKG without signs of acute ischemia.  Labs 

unremarkable including WBC and troponin within normal limits.  BNP elevated to 

5000 over chest x-ray without any pulmonary edema patient is without any 

respiratory symptoms saturating normally on room air.  Delta 2 hour troponin 

also negative.  Thus ACS unlikely as well as unlikely to have had any period of 

extensive RVR.  Chest x-ray negative for pneumonia.  Patient continued to be 

rate controlled throughout his ED observation and feeling improved.  Patient 

really has PCP follow-up scheduled for Monday from his recent discharge. 

Findings and plan for follow-up reviewed with patient. Patient agreeable and d/c

'd per discharge instructions.





Medication Reconcilliation


Current Medication List:  was personally reviewed by me





Blood Pressure Screening


Patient's blood pressure:  Elevated blood pressure





Impression





 Primary Impression:  


 Substernal chest pain





Scribe Attestation


The scribe's documentation has been prepared under my direction and personally 

reviewed by me in its entirety. I confirm that the note above accurately 

reflects all work, treatment, procedures, and medical decision making performed 

by me.





Departure Information


Dispostion


Home / Self-Care





Referrals


Oliver Chen M.D. (PCP)





Forms


HOME CARE DOCUMENTATION FORM,                                                 

               IMPORTANT VISIT INFORMATION





Patient Instructions


ED Chest Pain Atypical Unkn Cause, My [facility]





Additional Instructions





Please follow up with your primary care physician on Monday as scheduled for re-

evaluation.





Otherwise, your exam, EKG, chest xray, and lab results did not show signs of an 

emergent condition at this time.





Return to the emergency department for worsening symptoms as described in the 

accompanying instructions.

## 2018-01-08 ENCOUNTER — HOSPITAL ENCOUNTER (INPATIENT)
Dept: HOSPITAL 45 - C.EDB | Age: 83
LOS: 4 days | Discharge: NURSING, REHAB OR LONG-TERM CARE | DRG: 813 | End: 2018-01-12
Attending: HOSPITALIST | Admitting: HOSPITALIST
Payer: COMMERCIAL

## 2018-01-08 ENCOUNTER — HOSPITAL ENCOUNTER (OUTPATIENT)
Dept: HOSPITAL 45 - C.LAB1850 | Age: 83
Discharge: HOME | End: 2018-01-08
Attending: PHYSICIAN ASSISTANT
Payer: COMMERCIAL

## 2018-01-08 VITALS
WEIGHT: 173.06 LBS | WEIGHT: 173.06 LBS | HEIGHT: 69 IN | BODY MASS INDEX: 25.63 KG/M2 | BODY MASS INDEX: 25.63 KG/M2 | HEIGHT: 69 IN

## 2018-01-08 VITALS
TEMPERATURE: 98.78 F | HEART RATE: 105 BPM | SYSTOLIC BLOOD PRESSURE: 128 MMHG | DIASTOLIC BLOOD PRESSURE: 70 MMHG | OXYGEN SATURATION: 93 %

## 2018-01-08 VITALS
OXYGEN SATURATION: 95 % | SYSTOLIC BLOOD PRESSURE: 118 MMHG | TEMPERATURE: 97.88 F | DIASTOLIC BLOOD PRESSURE: 75 MMHG | HEART RATE: 110 BPM

## 2018-01-08 DIAGNOSIS — I48.91: ICD-10-CM

## 2018-01-08 DIAGNOSIS — C85.99: ICD-10-CM

## 2018-01-08 DIAGNOSIS — Z85.46: ICD-10-CM

## 2018-01-08 DIAGNOSIS — R42: Primary | ICD-10-CM

## 2018-01-08 DIAGNOSIS — I48.1: ICD-10-CM

## 2018-01-08 DIAGNOSIS — Z86.718: ICD-10-CM

## 2018-01-08 DIAGNOSIS — Z79.82: ICD-10-CM

## 2018-01-08 DIAGNOSIS — I50.22: ICD-10-CM

## 2018-01-08 DIAGNOSIS — R19.5: ICD-10-CM

## 2018-01-08 DIAGNOSIS — Z51.5: ICD-10-CM

## 2018-01-08 DIAGNOSIS — D62: ICD-10-CM

## 2018-01-08 DIAGNOSIS — T39.015A: ICD-10-CM

## 2018-01-08 DIAGNOSIS — G62.9: ICD-10-CM

## 2018-01-08 DIAGNOSIS — T45.515A: ICD-10-CM

## 2018-01-08 DIAGNOSIS — Z66: ICD-10-CM

## 2018-01-08 DIAGNOSIS — Z82.49: ICD-10-CM

## 2018-01-08 DIAGNOSIS — Z79.01: ICD-10-CM

## 2018-01-08 DIAGNOSIS — K25.4: ICD-10-CM

## 2018-01-08 DIAGNOSIS — Z83.3: ICD-10-CM

## 2018-01-08 DIAGNOSIS — Z79.899: ICD-10-CM

## 2018-01-08 DIAGNOSIS — D61.82: ICD-10-CM

## 2018-01-08 DIAGNOSIS — D68.32: Primary | ICD-10-CM

## 2018-01-08 LAB
ALBUMIN SERPL-MCNC: 2.6 GM/DL (ref 3.4–5)
ALP SERPL-CCNC: 150 U/L (ref 45–117)
ALT SERPL-CCNC: 23 U/L (ref 12–78)
AST SERPL-CCNC: 75 U/L (ref 15–37)
BUN SERPL-MCNC: 27 MG/DL (ref 7–18)
CALCIUM SERPL-MCNC: 9.6 MG/DL (ref 8.5–10.1)
CO2 SERPL-SCNC: 24 MMOL/L (ref 21–32)
CREAT SERPL-MCNC: 1.38 MG/DL (ref 0.6–1.4)
EOSINOPHIL NFR BLD AUTO: 136 K/UL (ref 130–400)
GLUCOSE SERPL-MCNC: 113 MG/DL (ref 70–99)
HCT VFR BLD CALC: 37.6 % (ref 42–52)
HCT VFR BLD CALC: 39.2 % (ref 42–52)
HGB BLD-MCNC: 13 G/DL (ref 14–18)
HGB BLD-MCNC: 13.3 G/DL (ref 14–18)
INR PPP: 6.4 (ref 0.9–1.1)
MCH RBC QN AUTO: 30.7 PG (ref 25–34)
MCHC RBC AUTO-ENTMCNC: 33.9 G/DL (ref 32–36)
MCV RBC AUTO: 90.5 FL (ref 80–100)
NRBC BLD AUTO-RTO: 1.5 %
NUCLEATED RED BLOOD CELL ABS: 0.2 K/UL (ref 0–0)
PMV BLD AUTO: 10.9 FL (ref 7.4–10.4)
POTASSIUM SERPL-SCNC: 4.4 MMOL/L (ref 3.5–5.1)
PROT SERPL-MCNC: 5.7 GM/DL (ref 6.4–8.2)
RED CELL DISTRIBUTION WIDTH CV: 14.5 % (ref 11.5–14.5)
RED CELL DISTRIBUTION WIDTH SD: 47.5 FL (ref 36.4–46.3)
SODIUM SERPL-SCNC: 134 MMOL/L (ref 136–145)
WBC # BLD AUTO: 13.32 K/UL (ref 4.8–10.8)

## 2018-01-08 RX ADMIN — SODIUM CHLORIDE AND POTASSIUM CHLORIDE SCH MLS/HR: 9; 1.49 INJECTION, SOLUTION INTRAVENOUS at 22:35

## 2018-01-08 RX ADMIN — PANTOPRAZOLE SODIUM SCH MLS/HR: 40 INJECTION, POWDER, FOR SOLUTION INTRAVENOUS at 21:04

## 2018-01-08 RX ADMIN — MORPHINE SULFATE PRN MG: 2 INJECTION, SOLUTION INTRAMUSCULAR; INTRAVENOUS at 22:35

## 2018-01-08 NOTE — EMERGENCY ROOM VISIT NOTE
History


Report prepared by [place]:  Porfirio Perez


Under the Supervision of:  Dr. Jose Miguel Leroy M.D.


First contact with patient:  18:16


Chief Complaint:  OTHER COMPLAINT


Stated Complaint:  ABNORMAL LAB, BLOOD IN STOOL





History of Present Illness


The patient is an 89 year old male who presents to the Emergency Room with 

complaints of an episode of abnormal lab results and black, tarry stool 

beginning today. The patient states that he was referred by his PCP due to an 

INR of 6.4 and his black, tarry stool. He notes that he had one episode of the 

black, tarry stool this morning. He also complains of fatigue and epigastric 

pain. He reports that he has been experiencing epigastric pain for the past few 

months. The patient states that his epigastric pain feels like a burning. The 

patient states that he has a history of prostate cancer that has been resected, 

but has mets on his back and spine. He notes that he has come into the 

emergency department twice within the last week for chest pain. He reports that 

he takes Coumadin and aspirin starting on January 4th for his atrial 

fibrillation symptoms but does not have any history of ulcers. He denies any 

nausea, vomiting, lightheadedness, SOB, and CP. He also denies any Aleve or 

ibuprofen usage, but occasionally takes a Tylenol at night. He rates his 

epigastric pain as a 2-3/10.





   Source of History:  patient


   Onset:  this morning


   Position:  other (global)


   Symptom Intensity:  2-3/10


   Quality:  other (black stools)


   Timing:  resolved (1 episode)


   Associated Symptoms:  + abdominal pain (epigastric), No chest pain, No SOB, 

No nausea, No vomiting


Note:


The patient also complains of fatigue. He denies any lightheadedness.





Review of Systems


I did review 10 or more systems which are negative unless otherwise indicated 

on the chart or HPI.





Past Medical & Surgical


Medical Problems:


(1) Acute blood loss anemia


(2) Atrial fibrillation


(3) CHF (congestive heart failure)


(4) DVT (deep venous thrombosis)


(5) H/O blood clots


(6) Heart disease


(7) Lyme disease


(8) Mantle cell lymphoma


(9) Non-Hodgkin lymphoma


(10) Prostate cancer


(11) Shingles


(12) Substernal discomfort


(13) Upper GI bleed


Surgical Problems:


(1) H/O prostatectomy


(2) S/P appendectomy








Family History





Diabetes mellitus


FH: cancer


FH: heart disease


FH: lung disease


Hypertension





Social History


Smoking Status:  Never Smoker


Alcohol Use:  occasionally


Drug Use:  none


Marital Status:  


Housing Status:  lives alone


Occupation Status:  unemployed





Current/Historical Medications


Scheduled


Amiodarone Hcl (Cordarone), 200 MG PO BID


Aspirin (Aspirin Ec), 81 MG PO QAM


Bicalutamide (Casodex), 50 MG PO QAM


Calcium Carbonate-Vitamin D (Oyster Shell Calcium/D3 500-400 mg-Unit), 2 TABS 

PO DAILY


Gabapentin (Neurontin), 300 MG PO BID


Leuprolide Acetate (Lupron Depot), 30 MG IM Q4MO


Metoprolol Tartrate (Lopressor), 12.5 MG PO BID


Multivitamin (Multivitamin), 1 TAB PO QAM


Omega-3 Fatty Acids (Fish Oil 1200 mg), 1,200 MG PO DAILY


Ranitidine (Zantac), 150 MG PO BID


Warfarin Sodium (Coumadin), 1 TAB PO 6XWK


Warfarin Sodium (Warfarin Sodium), 7.5 MG PO WK





Scheduled PRN


Furosemide (Furosemide), 20-40 MG PO DAILY PRN for "DEPENDS ON WT GAIN".





Allergies


Coded Allergies:  


     Acetazolamide (Verified  Allergy, Unknown, Diamox Eye Drops ? rxn, 1/2/18)





Physical Exam


Vital Signs











  Date Time  Temp Pulse Resp B/P (MAP) Pulse Ox O2 Delivery O2 Flow Rate FiO2


 


1/8/18 21:09  98 22 132/72 96 Room Air  


 


1/8/18 20:32  97 22 137/85 96 Room Air  


 


1/8/18 19:19  102      


 


1/8/18 19:18  97 18 125/68 94 Room Air  


 


1/8/18 18:06 37.0 89 16 135/95 98 Room Air  











Physical Exam


Constitutional: Vital signs reviewed.


Eyes: Pupils are equal round reactive to light.  Conjunctiva are noninjected.  


ENT: Pharynx is clear without erythema or exudate.  Mucous membranes are moist.

  Neck supple without meningeal signs.


Respiratory: Clear to auscultation bilaterally.  Breath sounds are equal 

bilaterally. 


Cardiovascular: Irregularly irregular rhythm.  Normal rate.  No rubs or gallops.


GI: Soft, nondistended with mild epigastric tenderness.  Bowel sounds are 

present.


Musculoskeletal: No peripheral edema.  No lower extremity tenderness. 


Integumentary: No cyanosis.


Neurological: The patient is awake and alert.  No focal deficits.


Psychiatric: Normal affect.





Medical Decision & Procedures


Laboratory Results


1/8/18 20:52

















Test


  1/8/18


19:12


 


Troponin I


  < 0.015 ng/ml


(0-0.045)





Laboratory results as reviewed by me.





Medications Administered











 Medications


  (Trade)  Dose


 Ordered  Sig/Maria Ines


 Route  Start Time


 Stop Time Status Last Admin


Dose Admin


 


 Phytonadione 10


 mg/Sodium Chloride  51 ml @ 


 102 mls/hr  ONE  ONCE


 IV  1/8/18 19:15


 1/8/18 19:44 DC 1/8/18 19:47


102 MLS/HR


 


 Pantoprazole


 Sodium 80 mg/


 Dextrose  120 ml @ 


 400 mls/hr  NOW


 IV  1/8/18 19:15


 1/8/18 22:00  1/8/18 20:22


400 MLS/HR


 


 Pantoprazole


 Sodium 40 mg/


 Dextrose  100 ml @ 


 20 mls/hr  Q5H


 IV  1/8/18 21:00


 1/9/18 01:59  1/8/18 21:04


20 MLS/HR











ECG


Indication:  abdominal pain


Rate (beats per minute):  90


Rhythm:  atrial fibrillation


Findings:  no acute ischemic change, no ectopy





ED Course


1852: The patient was evaluated in room B3. A complete history and physical 

exam was performed.





1915: Phytonadione 10mg/Sodium Chloride 51 ml @ 102 mls/hr Protocol IV





1924: I spoke to Dr. Russell - Pathology. We agree with vitamin K. 





1943: Upon reevaluation, the patient appears stable. I discussed the treatment 

plan with him and he agrees. I discussed the patient's case with Dr. Jacobs - Hospitalist, [facility] [place]. The patient will be evaluated for further 

management and care.





Medical Decision


This is an 89-year-old male who presents with black stools.  Differential 

diagnosis includes GI bleed, peptic ulcer disease, gastritis, anemia, 

supratherapeutic INR.  I did perform a limited focused review of portions of 

the patient's old chart on the electronic medical record. The patient has seen 

on January 2nd, 2016, and January 5th, 2016. On January 2nd, the patient was 

admitted for chest pain and placed on amiodarone. On January 5th, the patient 

was seen for chest tightness, and discharged after evaluation. 





I did evaluate the patient as noted above.  IV access was established.  The 

patient was placed on a continuous cardiac monitor.   I did order and review 

the patient's blood work as noted in the electronic medical record.  I also 

reviewed his blood work from earlier today.  His hemoglobin is stable.  His INR 

is over 6.  I did treat him with Protonix IV.  I also gave him 10 mg of IV 

vitamin K.  I did not feel the case and she was indicated at this time.  I did 

discuss this with Dr. Russell who agreed.  She stated that while amiodarone may 

be contributing to his elevated INR, she knows him well from the clinic and has 

noted that his INR has occasionally spiked at times without clear reason.  The 

patient was hospitalized for further evaluation.





Resident Physician Supervision Note:


I did evaluate and examine this patient myself.  I did guide management for the 

patient. I agree with the resident's, Dr. Evon León, assessment as 

discussed.  Please see the resident's dictation for further details.





Medication Reconcilliation


Current Medication List:  was personally reviewed by me





Blood Pressure Screening


Patient's blood pressure:  Elevated blood pressure


Blood pressure disposition:  Referred to PCP





Consults


Time Called:  1920


Consulting Physician:  Dr. Russell - Pathology


Returned Call:  1924


I spoke to Dr. Russell - Pathology. We agree with vitamin K.


Additional Consults:  


   Time Called:  1940


   Consulted Physician:  Dr. Jacobs


   Returned Call:  1943


Additional Comments:


I discussed the patient's case with Dr. Jacobs - Hospitalist, [facility] [place]. The 

patient will be evaluated for further management and care.





Impression





 Primary Impression:  


 GI bleed


 Additional Impressions:  


 Supratherapeutic INR


 Epigastric abdominal pain





Scribe Attestation


The scribe's documentation has been prepared under my direct and personally 

reviewed by me in its entirety. I confirm that the note above accurately 

reflects all work, treatment, procedures, and medical decision making performed 

by me.





Departure Information


Dispostion


Being Evaluated By Hospitalist





Referrals


Oliver Chen M.D. (PCP)





Patient Instructions


My [facility]





Problem Qualifiers








 Primary Impression:  


 GI bleed


 GI bleed type/associated pathology:  melena  Qualified Codes:  K92.1 - Melena

## 2018-01-08 NOTE — EMERGENCY ROOM VISIT NOTE
History


First contact with patient:  18:16


Chief Complaint:  OTHER COMPLAINT


Stated Complaint:  ABNORMAL LAB, BLOOD IN STOOL





History of Present Illness


The patient is a 89 year old male who was referred to the emergency department 

by his PCP for an INR level of 6.4, as well as black tarry stools. The patient 

reports he had an episode of black tarry stools this morning, and has not had 

this before. He states he has also had epigastric pain for the last 2 months, 

that is a 2-3/10 in severity. He denies a history of ulcers. He states he has 

also felt fatigued over the past few days, but denies dizziness, shortness of 

breath or chest pain. He reports he is on warfarin for his atrial fibrillation, 

as well as aspirin. He states he has a history of prostate cancer and has mets 

to his bones. He denies hematemesis, hemoptysis, hematuria or mucosal bleeding.





Review of Systems


See HPI for pertinent positives & negatives. A total of 10 systems reviewed and 

were otherwise negative.





Past Medical/Surgical History


Medical Problems:


(1) Acute blood loss anemia


(2) Atrial fibrillation


(3) CHF (congestive heart failure)


(4) DVT (deep venous thrombosis)


(5) H/O blood clots


(6) Heart disease


(7) Lyme disease


(8) Mantle cell lymphoma


(9) Non-Hodgkin lymphoma


(10) Prostate cancer


(11) Shingles


(12) Substernal discomfort


(13) Upper GI bleed


Surgical Problems:


(1) H/O prostatectomy


(2) S/P appendectomy








Family History





Diabetes mellitus


FH: cancer


FH: heart disease


FH: lung disease


Hypertension





Social History


Smoking Status:  Never Smoker


Alcohol Use:  occasionally


Drug Use:  none


Marital Status:  


Housing Status:  lives alone


Occupation Status:  unemployed





Current/Historical Medications


Scheduled


Amiodarone Hcl (Cordarone), 200 MG PO BID


Aspirin (Aspirin Ec), 81 MG PO QAM


Bicalutamide (Casodex), 50 MG PO QAM


Calcium Carbonate-Vitamin D (Oyster Shell Calcium/D3 500-400 mg-Unit), 2 TABS 

PO DAILY


Gabapentin (Neurontin), 300 MG PO BID


Leuprolide Acetate (Lupron Depot), 30 MG IM Q4MO


Metoprolol Tartrate (Lopressor), 12.5 MG PO BID


Multivitamin (Multivitamin), 1 TAB PO QAM


Omega-3 Fatty Acids (Fish Oil 1200 mg), 1,200 MG PO DAILY


Ranitidine (Zantac), 150 MG PO BID


Warfarin Sodium (Coumadin), 1 TAB PO 6XWK


Warfarin Sodium (Warfarin Sodium), 7.5 MG PO WK





Scheduled PRN


Furosemide (Furosemide), 20-40 MG PO DAILY PRN for "DEPENDS ON WT GAIN".





Physical Exam


Vital Signs











  Date Time  Temp Pulse Resp B/P (MAP) Pulse Ox O2 Delivery O2 Flow Rate FiO2


 


1/8/18 21:09  98 22 132/72 96 Room Air  


 


1/8/18 20:32  97 22 137/85 96 Room Air  


 


1/8/18 19:19  102      


 


1/8/18 19:18  97 18 125/68 94 Room Air  


 


1/8/18 18:06 37.0 89 16 135/95 98 Room Air  











Physical Exam


HEENT: Head - normocephalic and atraumatic. Mouth - moist buccal mucosa.  

Oropharynx is nonerythematous and there is no tonsillar exudate or edema noted.


Heart: Irregularly irregular pulse  There is a normal S1 and S2 with no murmurs

, clicks, or gallops appreciated.


Lungs: Clear to auscultation bilaterally with no wheezes, rales, or rhonchi.


Abdomen: Soft, mild tenderness over epigastric region, nondistended, with good 

bowel sounds.  There are no palpable pulsatile masses or hepatosplenomegaly.  

There is no guarding, rigidity, or rebound noted.


Extremities: No evidence of cyanosis, clubbing, or edema.  There are easily 

palpable peripheral pulses.


Neuro:The patient is awake and alert, oriented to day, time, and place.





Medical Decision & Procedures


Laboratory Results


1/8/18 20:52

















Test


  1/8/18


19:12


 


Troponin I


  < 0.015 ng/ml


(0-0.045)











Medications Administered











 Medications


  (Trade)  Dose


 Ordered  Sig/Maria Ines


 Route  Start Time


 Stop Time Status Last Admin


Dose Admin


 


 Phytonadione 10


 mg/Sodium Chloride  51 ml @ 


 102 mls/hr  ONE  ONCE


 IV  1/8/18 19:15


 1/8/18 19:44 DC 1/8/18 19:47


102 MLS/HR


 


 Pantoprazole


 Sodium 80 mg/


 Dextrose  120 ml @ 


 400 mls/hr  NOW


 IV  1/8/18 19:15


 1/8/18 22:00  1/8/18 20:22


400 MLS/HR


 


 Pantoprazole


 Sodium 40 mg/


 Dextrose  100 ml @ 


 20 mls/hr  Q5H


 IV  1/8/18 21:00


 1/9/18 01:59  1/8/18 21:04


20 MLS/HR











ECG


Indication:  abdominal pain, weakness


Rate (beats per minute):  90


Rhythm:  atrial fibrillation





ED Course


18:16: The patient was evaluated in room B3. A complete history and physical 

exam was performed.


18:30: The case was discussed with the attending, Dr. Leroy.


18:45: Stool Guaiac Positive


19:15: 10mg IV vitamin K and 80mg IV pantoprazole ordered


19:24: Dr. Leroy discussed the case with Dr. Russell.


19:30: The patient was reassessed. He reports no new complaints.


19:43: Dr. Leroy discussed the case with Dr. Jacobs ([facility] Hospitalist) who 

will evaluate the patient for admission.





Medical Decision


Etiologies such as peptic ulcer disease, diverticulosis, AVM,  coagulopathy, 

colitis malignancy, gastritis, hemorrhoids, as well as others were entertained.

  





Mr. Bradford is an 89 year old gentleman who presented with black tarry stools 

and an INR level of 6.4. His hemoglobin level was 13 as per his lab work today. 

He was treated with IV protonix and 10mg of IV vitamin K. His troponin level 

was negative. He is currently hemodynamically stable. He warrants admission for 

further evaluation of his suspected upper GI bleed.





Impression





 Primary Impression:  


 Elevated INR


 Additional Impression:  


 GI bleed





Departure Information


Dispostion


Being Evaluated By Hospitalist





Referrals


Oliver Chen M.D. (PCP)





Forms


WORK / SCHOOL INSTRUCTIONS, HOME CARE DOCUMENTATION FORM,                      

                                          IMPORTANT VISIT INFORMATION





Patient Instructions


[facility]





Resident Tracking


Resident Involvement:  Resident Care Provided


Care Provided:  Adult ED





Problem Qualifiers








 Additional Impression:  


 GI bleed


 GI bleed type/associated pathology:  melena  Qualified Codes:  K92.1 - Melena

## 2018-01-08 NOTE — HISTORY AND PHYSICAL
History & Physical


Date & Time of Service:


Jan 8, 2018 at 21:51


Chief Complaint:


Abnormal Lab, Blood In Stool


Primary Care Physician:


Oliver Chen M.D.


History of Present Illness


Source:  patient, spouse, hospital records


The patient is a 89-year-old male referred to the emergency department by his 

PCP for an INR of 6.4, performed earlier in the day, and black tarry stools.  

The patient reports that he's had intermittent epigastric area pain for the 

past 2 months, and had a single episode of black tarry stools this morning.  He 

reports fatigue over the past few days.  He presently is on warfarin for atrial 

fibrillation, and also takes aspirin.





Past Medical/Surgical History


Medical Problems:


(1) Atrial fibrillation


Status: Chronic  





(2) CHF (congestive heart failure)


Status: Chronic  





(3) DVT (deep venous thrombosis)


Status: Chronic  





(4) H/O blood clots


Status: Chronic  





(5) Heart disease


Status: Chronic  





(6) Lyme disease


Status: Chronic  





(7) Mantle cell lymphoma


Status: Chronic  





(8) Non-Hodgkin lymphoma


Status: Chronic  





(9) Prostate cancer


Status: Chronic  





(10) Shingles


Status: Resolved  





Surgical Problems:


(1) H/O prostatectomy


Status: Resolved  





(2) S/P appendectomy


Status: Resolved  











Family History





Diabetes mellitus


FH: cancer


FH: heart disease


FH: lung disease


Hypertension





Social History


Smoking Status:  Never Smoker


Smokeless Tobacco Use:  No


Alcohol Use:  none


Drug Use:  none


Marital Status:  


Housing status:  lives with family


Occupational Status:  unemployed





Immunizations


History of Influenza Vaccine:  Unknown


History of Tetanus Vaccine?:  utd


History of Pneumococcal:  Unknown


History of Hepatitis B Vaccine:  No





Multi-Drug Resistant Organisms


History of MDRO:  No





Allergies


Coded Allergies:  


     Acetazolamide (Verified  Allergy, Unknown, Diamox Eye Drops ? rxn, 1/2/18)





Home Medications


Scheduled


Amiodarone Hcl (Cordarone), 200 MG PO BID


Aspirin (Aspirin Ec), 81 MG PO QAM


Bicalutamide (Casodex), 50 MG PO QAM


Calcium Carbonate-Vitamin D (Oyster Shell Calcium/D3 500-400 mg-Unit), 2 TABS 

PO DAILY


Gabapentin (Neurontin), 300 MG PO BID


Leuprolide Acetate (Lupron Depot), 30 MG IM Q4MO


Metoprolol Tartrate (Lopressor), 12.5 MG PO BID


Multivitamin (Multivitamin), 1 TAB PO QAM


Omega-3 Fatty Acids (Fish Oil 1200 mg), 1,200 MG PO DAILY


Ranitidine (Zantac), 150 MG PO BID


Warfarin Sodium (Coumadin), 1 TAB PO 6XWK


Warfarin Sodium (Warfarin Sodium), 7.5 MG PO WK





Scheduled PRN


Furosemide (Furosemide), 20-40 MG PO DAILY PRN for "DEPENDS ON WT GAIN".





Review of Systems


The patient denies chest pain, palpitations, shortness of breath, cough, lower 

extremity swelling, vision change, hearing change, 


sore throat, fevers, chills, sweats, nausea, vomiting, diarrhea or constipation

,  


blood in urine or stool, dysuria, urinary frequency or urgency,  memory loss, 

loss of consciousness, rash, 


focal weakness, numbness or tingling in arms or legs, generalized arthralgias 

or myalgias, back or neck pain, or night sweats.


The review of systems is otherwise negative other than for that already noted 

above, and at least 10 systems have been reviewed.





Physical Exam


Vital Signs











  Date Time  Temp Pulse Resp B/P (MAP) Pulse Ox O2 Delivery O2 Flow Rate FiO2


 


1/8/18 21:09  98 22 132/72 96 Room Air  


 


1/8/18 20:32  97 22 137/85 96 Room Air  


 


1/8/18 19:19  102      


 


1/8/18 19:18  97 18 125/68 94 Room Air  


 


1/8/18 18:06 37.0 89 16 135/95 98 Room Air  








The patient is awake, alert and oriented 3, normocephalic and atraumatic, 

appears lethargic, lying in bed and in no acute distress.


HEENT--PERRL, EOMI, mucous membranes  and oropharynx dry.


Neck--supple, no JVD or bruits, thyroid normal, trachea midline, no adenopathy.


Heart--irregularly irregular, no murmurs, rubs or gallops.


Lungs--clear bilaterally with good air movement, no respiratory distress, no 

accessory muscle use.


Abdomen--normal bowel sounds and soft, nontender and nondistended, no hernias 

or masses,  no organomegaly.


Extremities--no cyanosis, clubbing or edema. There are good distal pulses b/l.


Dermatologic--normal skin turgor, normal color, warm and dry, no abnormal lymph 

nodes, no rash.


Neurologic--cranial nerves II through XII grossly intact.


Rheumatologic--normal range of motion.


Psychiatric--flat affect.





Diagnostics


Laboratory Results





Results Past 24 Hours








Test


  1/8/18


19:12 1/8/18


20:52 Range/Units


 


 


Troponin I < 0.015  0-0.045  ng/ml


 


Hemoglobin  13.0 14.0-18.0  g/dL


 


Hematocrit  37.6 42-52  %











EKG


EKG shows atrial fibrillation at 90 bpm, left axis deviation, ST depressions V5 

and V6, no change compared to 01/05/2018.





Impression


Assessment and Plan


Upper GI bleed/acute blood loss anemia--


The patient will be admitted to telemetry for serial cardiac enzymes, cardiac 

rhythm monitoring and a 2-D echocardiogram with Dopplers.


The patient is already received vitamin K by ED staff


Hold fish oil, aspirin and warfarin


H&H every 6 hours


Continue Protonix drip begun in the ED


Consent obtained for possible transfusion


Nothing by mouth status


Consult gastroenterology for possible EGD





Atrial fibrillation/chronic anticoagulation with warfarin/reversal with 10 mg 

of vitamin K IV--


Repeat PT/INR in a.m.


hold aspirin, metoprolol tartrate, amiodarone  and warfarin.


Consult his cardiologist Dr. Mark





Peripheral neuropathy--


Hold gabapentin





Metastatic prostate cancer--


Hold Casodex


Lupron Depot IM injection is every 4 months





Level of Care


Telemetry





Advanced Directives


Existing Living Will:  Yes





Resuscitation Status


FULL RESUSCITATION





VTE Prophylaxis


VTE Risk Assessment Done? Y/N:  Yes


Risk Level:  Moderate


Given or contraindicated:  Warfarin (Coumadin) (anticoagulation is being 

reversed with vitamin K due to supratherapeutic INR)

## 2018-01-09 VITALS — OXYGEN SATURATION: 92 % | HEART RATE: 109 BPM | DIASTOLIC BLOOD PRESSURE: 76 MMHG | SYSTOLIC BLOOD PRESSURE: 135 MMHG

## 2018-01-09 VITALS — SYSTOLIC BLOOD PRESSURE: 135 MMHG | HEART RATE: 105 BPM | DIASTOLIC BLOOD PRESSURE: 80 MMHG

## 2018-01-09 VITALS
OXYGEN SATURATION: 93 % | SYSTOLIC BLOOD PRESSURE: 128 MMHG | HEART RATE: 104 BPM | TEMPERATURE: 98.06 F | DIASTOLIC BLOOD PRESSURE: 75 MMHG

## 2018-01-09 VITALS
SYSTOLIC BLOOD PRESSURE: 139 MMHG | TEMPERATURE: 97.52 F | HEART RATE: 109 BPM | OXYGEN SATURATION: 92 % | DIASTOLIC BLOOD PRESSURE: 81 MMHG

## 2018-01-09 VITALS — OXYGEN SATURATION: 94 % | DIASTOLIC BLOOD PRESSURE: 70 MMHG | SYSTOLIC BLOOD PRESSURE: 124 MMHG | HEART RATE: 100 BPM

## 2018-01-09 VITALS
OXYGEN SATURATION: 95 % | TEMPERATURE: 97.88 F | SYSTOLIC BLOOD PRESSURE: 124 MMHG | HEART RATE: 108 BPM | DIASTOLIC BLOOD PRESSURE: 75 MMHG

## 2018-01-09 VITALS
OXYGEN SATURATION: 94 % | DIASTOLIC BLOOD PRESSURE: 65 MMHG | SYSTOLIC BLOOD PRESSURE: 121 MMHG | TEMPERATURE: 98.24 F | HEART RATE: 119 BPM

## 2018-01-09 VITALS — OXYGEN SATURATION: 95 %

## 2018-01-09 VITALS
TEMPERATURE: 98.78 F | HEART RATE: 108 BPM | DIASTOLIC BLOOD PRESSURE: 78 MMHG | SYSTOLIC BLOOD PRESSURE: 124 MMHG | OXYGEN SATURATION: 95 %

## 2018-01-09 VITALS — HEART RATE: 113 BPM | DIASTOLIC BLOOD PRESSURE: 89 MMHG | SYSTOLIC BLOOD PRESSURE: 162 MMHG

## 2018-01-09 VITALS — SYSTOLIC BLOOD PRESSURE: 129 MMHG | DIASTOLIC BLOOD PRESSURE: 81 MMHG | OXYGEN SATURATION: 90 % | HEART RATE: 108 BPM

## 2018-01-09 VITALS — HEART RATE: 112 BPM | OXYGEN SATURATION: 92 %

## 2018-01-09 VITALS — DIASTOLIC BLOOD PRESSURE: 76 MMHG | SYSTOLIC BLOOD PRESSURE: 108 MMHG | HEART RATE: 111 BPM

## 2018-01-09 VITALS — HEART RATE: 109 BPM

## 2018-01-09 VITALS — OXYGEN SATURATION: 94 % | HEART RATE: 99 BPM

## 2018-01-09 VITALS — HEART RATE: 111 BPM | OXYGEN SATURATION: 95 %

## 2018-01-09 VITALS
SYSTOLIC BLOOD PRESSURE: 122 MMHG | OXYGEN SATURATION: 93 % | DIASTOLIC BLOOD PRESSURE: 65 MMHG | TEMPERATURE: 98.6 F | HEART RATE: 116 BPM

## 2018-01-09 VITALS — HEART RATE: 101 BPM | SYSTOLIC BLOOD PRESSURE: 139 MMHG | DIASTOLIC BLOOD PRESSURE: 81 MMHG

## 2018-01-09 VITALS — HEART RATE: 106 BPM

## 2018-01-09 LAB
ALBUMIN SERPL-MCNC: 2.3 GM/DL (ref 3.4–5)
ALP SERPL-CCNC: 127 U/L (ref 45–117)
ALT SERPL-CCNC: 20 U/L (ref 12–78)
AST SERPL-CCNC: 72 U/L (ref 15–37)
BUN SERPL-MCNC: 23 MG/DL (ref 7–18)
CALCIUM SERPL-MCNC: 9 MG/DL (ref 8.5–10.1)
CK MB SERPL-MCNC: 2.9 NG/ML (ref 0.5–3.6)
CK MB SERPL-MCNC: 4.1 NG/ML (ref 0.5–3.6)
CO2 SERPL-SCNC: 26 MMOL/L (ref 21–32)
CREAT SERPL-MCNC: 1.09 MG/DL (ref 0.6–1.4)
EOSINOPHIL NFR BLD AUTO: 108 K/UL (ref 130–400)
GLUCOSE SERPL-MCNC: 82 MG/DL (ref 70–99)
HCT VFR BLD CALC: 37.3 % (ref 42–52)
HCT VFR BLD CALC: 37.8 % (ref 42–52)
HCT VFR BLD CALC: 38.5 % (ref 42–52)
HGB BLD-MCNC: 12.7 G/DL (ref 14–18)
HGB BLD-MCNC: 13 G/DL (ref 14–18)
HGB BLD-MCNC: 13 G/DL (ref 14–18)
INR PPP: 1.6 (ref 0.9–1.1)
MCH RBC QN AUTO: 30.5 PG (ref 25–34)
MCHC RBC AUTO-ENTMCNC: 34 G/DL (ref 32–36)
MCV RBC AUTO: 89.4 FL (ref 80–100)
NRBC BLD AUTO-RTO: 1.5 %
NUCLEATED RED BLOOD CELL ABS: 0.18 K/UL (ref 0–0)
PMV BLD AUTO: 10.3 FL (ref 7.4–10.4)
POTASSIUM SERPL-SCNC: 4.1 MMOL/L (ref 3.5–5.1)
PROT SERPL-MCNC: 4.9 GM/DL (ref 6.4–8.2)
PTT PATIENT: 33.6 SECONDS (ref 21–31)
RED CELL DISTRIBUTION WIDTH CV: 14.6 % (ref 11.5–14.5)
RED CELL DISTRIBUTION WIDTH SD: 47.1 FL (ref 36.4–46.3)
SODIUM SERPL-SCNC: 134 MMOL/L (ref 136–145)
WBC # BLD AUTO: 12.31 K/UL (ref 4.8–10.8)

## 2018-01-09 PROCEDURE — 0DB68ZX EXCISION OF STOMACH, VIA NATURAL OR ARTIFICIAL OPENING ENDOSCOPIC, DIAGNOSTIC: ICD-10-PCS | Performed by: INTERNAL MEDICINE

## 2018-01-09 RX ADMIN — SUCRALFATE SCH GM: 1 SUSPENSION ORAL at 19:23

## 2018-01-09 RX ADMIN — PANTOPRAZOLE SODIUM SCH MLS/HR: 40 INJECTION, POWDER, FOR SOLUTION INTRAVENOUS at 07:08

## 2018-01-09 RX ADMIN — PANTOPRAZOLE SCH MG: 40 TABLET, DELAYED RELEASE ORAL at 19:23

## 2018-01-09 RX ADMIN — SODIUM CHLORIDE AND POTASSIUM CHLORIDE SCH MLS/HR: 9; 1.49 INJECTION, SOLUTION INTRAVENOUS at 18:09

## 2018-01-09 RX ADMIN — MORPHINE SULFATE PRN MG: 2 INJECTION, SOLUTION INTRAMUSCULAR; INTRAVENOUS at 23:19

## 2018-01-09 RX ADMIN — SUCRALFATE SCH GM: 1 SUSPENSION ORAL at 18:10

## 2018-01-09 RX ADMIN — PANTOPRAZOLE SODIUM SCH MLS/HR: 40 INJECTION, POWDER, FOR SOLUTION INTRAVENOUS at 02:09

## 2018-01-09 NOTE — CLINICAL DOCUMENTATION QUERY
JULISSA Carty :



**** CLINICAL DOCUMENTATION QUERY****



Patient is an 89 year old male admitted from PCP with black tarry stools, intermittent 
epigastric pain, and fatigue in the setting of an INR of 6.4 in the setting of Coumadin 
therapy.  As appropriate, consider documentation as suggested below as this impacts 
appropriate DRG assignment. 



In your clinical opinion is this patient being managed for:

    

                        ( x ) Coagulation defect (or hemorrhagic disorder) secondary to 
Coumadin therapy and aspirin

                        (  ) Not Agree



                        (  ) Other explanation of clinical findings (Please Explain)

                        (  ) Unable to determine (Please Define)

                        (  ) Need to Discuss

                        



The medical record reflects the following clinical findings, treatment, and risk factors.  
  



Clinical Indicators: As above

Treatment: Vitamin K, holding fish oil, ASA, Coumadin

Risk Factors: Coumadin therapy



CC: Bleeding d/t Therapeutic Anticoagulation  



Coding Clinic [id number], p14



Question: Should bleeding due to therapeutic anticoagulant be coded as a hemorrhagic 
disorder (category D68)?



Answer: For the most part, "hemorrhagic disorder" or "coagulation defects" must be 
specifically diagnosed and documented by the provider, in order to assign codes at 
category D68, Other coagulation defects. However, for bleeding such as hemoptysis, 
hematuria, hematemesis, hematochezia, etc., that is associated with a drug, as part of 
anticoagulation therapy, assign code D68.32, Hemorrhagic disorder due to extrinsic 
circulating anticoagulants. This is supported by the inclusion term at D68.32 of 
"Drug-induced hemorrhagic disorder." The sequencing of code D68.32 and other codes 
describing the type or site of bleeding, (e.g., hemoptysis or hematuria), would be 
dependent on the circumstances of the admission.



Copyright (2018), [employer] ("AHA"), [place], [place]. Reproduced 
with permission. No portion of this publication may be copied without the express, written 
consent of [facility].



Please clarify and document your clinical opinion in the progress notes and discharge 
summary. Terms such as "probable", "suspected", "likely", "questionable", "possible", or 
"still to be ruled out" are acceptable. 



*****IF IN AGREEMENT, YOU MUST DOCUMENT ABOVE DIAGNOSTIC STATEMENT IN DAILY PROGRESS NOTES 
AND DISCHARGE SUMMARY. This document is not part of the patient's record.*****

Thank You, Juan Carlos León, LIZETT [phone]

## 2018-01-09 NOTE — ANESTHESIOLOGY PROGRESS NOTE
Anesthesia Post Op Note


Date & Time


Jan 9, 2018 at 14:02





Vital Signs


Pain Intensity:  0





Vital Signs Past 12 Hours








  Date Time  Temp Pulse Resp B/P (MAP) Pulse Ox O2 Delivery O2 Flow Rate FiO2


 


1/9/18 13:48  105 18 130/72 (91) 96 Room Air  


 


1/9/18 12:45 36.5 95 18 108/74 (85) 98 Room Air  


 


1/9/18 12:00      Room Air  


 


1/9/18 12:00 37.0 116 22 122/65 (84) 93 Room Air  


 


1/9/18 08:00      Room Air  


 


1/9/18 07:50 36.7 104 20 128/75 (92) 93 Room Air  


 


1/9/18 04:00     95 Room Air  


 


1/9/18 03:35 36.9 108 20 124/78 (93) 91 Room Air  











Notes


Mental Status:  alert / awake / arousable, participated in evaluation


Pt Amnestic to Procedure:  Yes


Nausea / Vomiting:  adequately controlled


Pain:  adequately controlled


Airway Patency, RR, SpO2:  stable & adequate


BP & HR:  stable & adequate


Hydration State:  stable & adequate


Anesthetic Complications:  no major complications apparent

## 2018-01-09 NOTE — MEDICAL STUDENT: [facility]
Med Student Progress Note


Date of Service


Jan 9, 2018.





Subjective


Pt evaluation today including:  conversation w/ patient


Pain:  Patient is having back pain


Today the patient states that he is not doing well.  He describes to me a 1 day 

history of black and tarry stools.  He states that he called his doctors office 

and they told him to come to the hospital.  He also describes feeling more 

fatigued over the past year or so.  The patient appears to be in moderate 

distress do to back pain.  He says it is because he has been laying down for 

the past several days while in the hospital.  He also has decreased alertness 

and struggles to keep his eyes open for me as I talk with him during the 

interview.  It is unclear how good a historian the patient is, and he mentions 

his daughter takes care of his medical affairs.  The patient has a history 

significant for CHF, atrial fibrillation, current mantel cell lymphoma, and 

current adenocarcinoma of the prostate.





Objective


Vital Signs











  Date Time  Temp Pulse Resp B/P (MAP) Pulse Ox O2 Delivery O2 Flow Rate FiO2


 


1/9/18 12:45 36.5 95 18 108/74 (85) 98 Room Air  


 


1/9/18 12:00      Room Air  


 


1/9/18 12:00 37.0 116 22 122/65 (84) 93 Room Air  


 


1/9/18 08:00      Room Air  


 


1/9/18 07:50 36.7 104 20 128/75 (92) 93 Room Air  


 


1/9/18 04:00     95 Room Air  


 


1/9/18 03:35 36.9 108 20 124/78 (93) 91 Room Air  


 


1/9/18 00:00     95 Room Air  


 


1/8/18 23:35 37.1 105 22 128/70 (89) 93 Room Air  


 


1/8/18 21:50 36.6 110 20 118/75 95 Room Air  


 


1/8/18 21:09  98 22 132/72 96 Room Air  


 


1/8/18 20:32  97 22 137/85 96 Room Air  


 


1/8/18 19:19  102      


 


1/8/18 19:18  97 18 125/68 94 Room Air  


 


1/8/18 18:06 37.0 89 16 135/95 98 Room Air  











Physical Exam


General Appearance:  WD/WN, + moderate distress


Eyes:  bilateral eyes normal inspection





Laboratory Results





Last 24 Hours








Test


  1/8/18


19:12 1/8/18


20:52 1/9/18


02:13 1/9/18


04:32


 


Troponin I < 0.015 ng/ml    < 0.015 ng/ml 


 


Hemoglobin  13.0 g/dL  12.7 g/dL  


 


Hematocrit  37.6 %  37.3 %  


 


White Blood Count   12.31 K/uL  


 


Red Blood Count   4.17 M/uL  


 


Mean Corpuscular Volume   89.4 fL  


 


Mean Corpuscular Hemoglobin   30.5 pg  


 


Mean Corpuscular Hemoglobin


Concent 


  


  34.0 g/dl 


  


 


 


Platelet Count   108 K/uL  


 


Mean Platelet Volume   10.3 fL  


 


RDW Standard Deviation   47.1 fL  


 


RDW Coefficient of Variation   14.6 %  


 


Nucleated RBC Absolute Count


(auto) 


  


  0.18 K/uL 


  


 


 


Neutrophils % (Manual)   80.5 %  


 


Band Neutrophils % (Manual)   0.0 %  


 


Lymphocytes % (Manual)   8.0 %  


 


Variant Lymphocytes % (manual)   0.0 %  


 


Monocytes % (Manual)   3.5 %  


 


Eosinophils % (Manual)   1.8 %  


 


Basophils % (Manual)   0.9 %  


 


Metamyelocytes %   3.5 %  


 


Myelocytes %   1.8 %  


 


Nucleated Red Blood Cells %   1.5 %  


 


Neutrophils # (Manual)   9.91 K/uL  


 


Total Absolute Neutrophils   9.91 K/uL  


 


Lymphocytes # (Manual)   0.98 K/uL  


 


Total Absolute Lymphocytes   0.98 K/uL  


 


Monocytes # (Manual)   0.43 K/uL  


 


Eosinophils # (Manual)   0.22 K/uL  


 


Basophils # (Manual)   0.11 K/uL  


 


Metamyelocytes #   0.43 K/uL  


 


Myelocytes #   0.22 K/uL  


 


Percent Large Granular


Lymphocytes 


  


  0.0 % 


  


 


 


Prothrombin Time    16.4 SECONDS 


 


Prothromb Time International


Ratio 


  


  


  1.6 


 


 


Activated Partial


Thromboplast Time 


  


  


  33.6 SECONDS 


 


 


Partial Thromboplastin Ratio    1.3 


 


Sodium Level    134 mmol/L 


 


Potassium Level    4.1 mmol/L 


 


Chloride Level    98 mmol/L 


 


Carbon Dioxide Level    26 mmol/L 


 


Anion Gap    10.0 mmol/L 


 


Blood Urea Nitrogen    23 mg/dl 


 


Creatinine    1.09 mg/dl 


 


Est Creatinine Clear Calc


Drug Dose 


  


  


  46.0 ml/min 


 


 


Estimated GFR (


American) 


  


  


  69.4 


 


 


Estimated GFR (Non-


American 


  


  


  59.9 


 


 


BUN/Creatinine Ratio    20.9 


 


Random Glucose    82 mg/dl 


 


Calcium Level    9.0 mg/dl 


 


Magnesium Level    1.6 mg/dl 


 


Total Bilirubin    0.8 mg/dl 


 


Direct Bilirubin    0.3 mg/dl 


 


Aspartate Amino Transf


(AST/SGOT) 


  


  


  72 U/L 


 


 


Alanine Aminotransferase


(ALT/SGPT) 


  


  


  20 U/L 


 


 


Alkaline Phosphatase    127 U/L 


 


Total Creatine Kinase    45 U/L 


 


Creatine Kinase MB    2.9 ng/ml 


 


Creatine Kinase MB Ratio    6.4 


 


Total Protein    4.9 gm/dl 


 


Albumin    2.3 gm/dl 


 


Test


  1/9/18


05:56 1/9/18


12:49 


  


 


 


Hemoglobin 13.0 g/dL    


 


Hematocrit 37.8 %    


 


Creatine Kinase MB Ratio     











Medications





Current Inpatient Medications








 Medications


  (Trade)  Dose


 Ordered  Sig/Maria Ines


 Route  Start Time


 Stop Time Status Last Admin


Dose Admin


 


 Potassium


 Chloride/Sodium


 Chloride  1,000 ml @ 


 50 mls/hr  Q20H


 IV  1/8/18 22:30


 2/7/18 20:26  1/8/18 22:35


50 MLS/HR


 


 Nitroglycerin


  (Nitrostat Tab)  0.4 mg  UD  PRN


 SL  1/8/18 20:30


 2/7/18 20:29   


 


 


 Ondansetron HCl


  (Zofran Odt)  8 mg  Q6H  PRN


 PO  1/8/18 20:45


 2/7/18 20:44  1/8/18 22:36


8 MG


 


 Morphine Sulfate


  (MoRPHine


 SULFATE INJ)  2 mg  Q2H  PRN


 IV  1/8/18 20:45


 1/22/18 20:44  1/8/18 22:35


2 MG


 


 Pantoprazole


 Sodium 40 mg/


 Dextrose  100 ml @ 


 20 mls/hr  Q5H


 IV  1/8/18 21:00


 2/7/18 20:59  1/9/18 07:08


20 MLS/HR


 


 Ephedrine Sulfate


  (EpHEDrine


 SULFATE INJ)  5 mg  Q5M  PRN


 IV  1/9/18 13:15


 1/10/18 13:14 UNV  


 


 


 Atropine Sulfate


  (Atropine


 Sulfate 0.1mg/ml


 Inj)  0.5 mg  Q1M  PRN


 IV  1/9/18 13:15


 1/10/18 13:14 UNV  


 











Assessment and Plan


Assessment and Plan:


A/P:


Elevated INR


-recived Vit. K injection for warfarin reversal 


-Discontinue warfarin for a short period of time to give ulcers time to heal 

before continuing in a few weeks/months.





Stomach ulcers


-EGD performed today showed 6 gastric ulcers


-Continue PPI


-Follow up with Hbg/Hct before discharge and again 1 week after discharge from 

hospital


-Was taking an 81 mg Aspirin, this is to be discontinued for the time being

## 2018-01-09 NOTE — CARDIOLOGY PROGRESS NOTE
DATE: 01/09/2018

 

DATE:  01/09/2018  

 

SUBJECTIVE:  Mr. Bradford is resting comfortably in bed without complaints of

chest pain, dyspnea, or palpitations.

 

OBJECTIVE:

VITAL SIGNS:  Blood pressure is 128/75 with a regular pulse of 100. 

Respiratory rate is 20.  The patient is afebrile at 36.7 degrees Celsius. 

Saturations 93% on room air.

NECK:  Supple with full carotid upstrokes.  No carotid bruits.  Jugular

venous pressure is flat at 90 degrees.  There is no thyromegaly.

CARDIOVASCULAR EXAMINATION:  Reveals an irregularly irregular rhythm with

distant heart sounds.  No obvious murmurs.

LUNGS:  Clear without rales, rhonchi, or wheezes.

ABDOMEN:  Soft with some mild tenderness in the mid epigastrium.

EXTREMITIES:  Reveal intact radial artery pulses bilaterally.  There is no

peripheral edema.

 

LABORATORY DATA:  CBC notes a hemoglobin of 13.0 and hematocrit 37.8.  White

count is 12.3, platelet count 108,000.  Electrolytes note a sodium of 134,

potassium 4.1, chloride 98, bicarb 26, BUN 23, creatinine 1.09, glucose 82. 

Two troponin I levels have been undetectable at less than 0.015.  INR is down

to 1.6 from a value of 6.4 at the time of admission.  Telemetry monitor notes

atrial fibrillation with borderline ventricular response.

 

MEDICATIONS:

1. Protonix drip.

2. Amiodarone -- on hold.

3. Metoprolol tartrate -- on hold.

4. Aspirin -- on hold.

5. Warfarin -- on hold.

 

IMPRESSION AND PLAN:

1. Upper gastrointestinal bleed - the patient has stabilized with the

reversal of his  Coumadin.  He is scheduled for an upper endoscopy later

today.  He is an acceptable cardiac risk for that procedure.

2. Persistent atrial fibrillation -- was started on amiodarone to improve

rate control during his last hospitalization.  That medication is currently

on hold.

3. Chronic systolic congestive heart failure -- compensated at this time.

4. Mild cardiomyopathy -- ejection fraction of 40-45% on echocardiogram

December 2017.

5. Mild to moderate aortic insufficiency.

6. History of right popliteal system thrombosis -- August 2015 -- Coumadin

started at that time.

7. History of mantle cell lymphoma.  

8. Prostate carcinoma.

## 2018-01-09 NOTE — GI REPORT
Procedure Date: 1/9/2018 1:28 PM

Procedure:            Upper GI endoscopy

Indications:          Melena

Medicines:            Monitored Anesthesia Care

Complications:        No immediate complications.

Estimated Blood Loss: Estimated blood loss: none.

Procedure:            Pre-Anesthesia Assessment:

                      - Prior to the procedure, a History and Physical was 

                      performed, and patient medications and allergies were 

                      reviewed. The patient's tolerance of previous 

                      anesthesia was also reviewed. The risks and benefits of 

                      the procedure and the sedation options and risks were 

                      discussed with the patient. All questions were 

                      answered, and informed consent was obtained. Prior 

                      Anticoagulants: The patient last took aspirin 1 day and 

                      Coumadin (warfarin) 1 day prior to the procedure. ASA 

                      Grade Assessment: IV - A patient with severe systemic 

                      disease that is a constant threat to life. After 

                      reviewing the risks and benefits, the patient was 

                      deemed in satisfactory condition to undergo the 

                      procedure.

                      After obtaining informed consent, the endoscope was 

                      passed under direct vision. Throughout the procedure, 

                      the patient's blood pressure, pulse, and oxygen 

                      saturations were monitored continuously. The Scope was 

                      introduced through the mouth, and advanced to the 

                      second part of duodenum. The upper GI endoscopy was 

                      accomplished without difficulty. The patient tolerated 

                      the procedure well.

Findings:

     The esophagus was normal.

     Six non-bleeding cratered gastric ulcers with no stigmata of bleeding 

     were found in the gastric fundus. The largest lesion was 15 mm in 

     largest dimension. Biopsies were taken with a cold forceps for histology.

     The examined duodenum was normal.

Impression:           - Normal esophagus.

                      - Non-bleeding gastric ulcers with no stigmata of 

                      bleeding. Biopsied.

                      - Normal examined duodenum.

Recommendation:       - Return patient to hospital [facility] for ongoing care.

                      - Use Protonix (pantoprazole) 40 mg PO BID.

                      - Use sucralfate suspension 1 gram PO QID for 10 days.

                      - Repeat upper endoscopy in 2 months to evaluate the 

                      response to therapy.

                      - Await pathology results.

Jasbir JUNEGina DO [facility]

1/9/2018 1:49:20 PM

This report has been signed electronically.

Note Initiated On: 1/9/2018 1:28 PM

     I attest to the content of the Intraoperative Record and orders 

     documented therein, exceptions below

## 2018-01-09 NOTE — PROGRESS NOTE
Subjective


Date of Service:


Jan 9, 2018.


Subjective


Pt evaluation today including:  conversation w/ patient, physical exam, chart 

review, lab review, review of studies, review of inpatient medication list


feeling better just a little groggy


on directed questioning admits to epigastric pain the last few weeks, had cut 

down on coffee because it was hurting his stoamch





feels better now


no ongoing bleeding


no other complaints or sx





Problem List


Medical Problems:


(1) Elevated INR


Status: Acute  





(2) Epigastric abdominal pain


Status: Acute  





(3) GI bleed


Status: Acute  





(4) Precordial chest pain


Status: Acute  





(5) Substernal chest pain


Status: Acute  





(6) Substernal precordial chest pain


Status: Acute  





(7) Superficial abrasion


Status: Acute  





(8) Supratherapeutic INR


Status: Acute  











Review of Systems


all other ROS otherwise negative except for as above





Objective


Vital Signs











  Date Time  Temp Pulse Resp B/P (MAP) Pulse Ox O2 Delivery O2 Flow Rate FiO2


 


1/9/18 16:32  113 25 162/89 (105)    


 


1/9/18 16:30  109 20     


 


1/9/18 16:16  111 22 108/76 (78)    


 


1/9/18 16:15  106 21     


 


1/9/18 16:01  100 22 124/70 (80) 94   


 


1/9/18 16:00  111 22  95   


 


1/9/18 15:46  108 24 129/81 (105) 90   


 


1/9/18 15:45  112 20  92   


 


1/9/18 15:31  109 19 135/76 (99) 92   


 


1/9/18 15:30  99 22  94   


 


1/9/18 15:16  101 22 139/81 (89)    


 


1/9/18 15:15  109 19     


 


1/9/18 15:15 36.4 95 23 139/81 (100) 92 Room Air  


 


1/9/18 15:01  105 23 135/80 (90)    


 


1/9/18 15:00  109 26     


 


1/9/18 14:39 36.6 108 18 124/75 (91) 95 Room Air  


 


1/9/18 14:03  98 18 127/80 (96) 94 Room Air  


 


1/9/18 13:48  105 18 130/72 (91) 96 Room Air  


 


1/9/18 12:45 36.5 95 18 108/74 (85) 98 Room Air  


 


1/9/18 12:00      Room Air  


 


1/9/18 12:00 37.0 116 22 122/65 (84) 93 Room Air  


 


1/9/18 08:00      Room Air  


 


1/9/18 07:50 36.7 104 20 128/75 (92) 93 Room Air  


 


1/9/18 04:00     95 Room Air  


 


1/9/18 03:35 36.9 108 20 124/78 (93) 91 Room Air  


 


1/9/18 00:00     95 Room Air  


 


1/8/18 23:35 37.1 105 22 128/70 (89) 93 Room Air  


 


1/8/18 21:50 36.6 110 20 118/75 95 Room Air  


 


1/8/18 21:09  98 22 132/72 96 Room Air  


 


1/8/18 20:32  97 22 137/85 96 Room Air  


 


1/8/18 19:19  102      


 


1/8/18 19:18  97 18 125/68 94 Room Air  


 


1/8/18 18:06 37.0 89 16 135/95 98 Room Air  











Physical Exam


General Appearance:  no apparent distress


Eyes:  EOMI


ENT:  hearing grossly normal


Neck:  trachea midline


Respiratory/Chest:  no respiratory distress, no accessory muscle use


Abdomen:  + pertinent finding (soft nd, mild epigastric ttp no guarding no 

rebound no rigidity)


Extremities:  normal range of motion


Neurologic/Psychiatric:  CNs II-XII nml as tested, alert, normal mood/affect


Skin:  normal color, warm/dry





Laboratory Results





Last 24 Hours








Test


  1/8/18


19:12 1/8/18


20:52 1/9/18


02:13 1/9/18


04:32


 


Troponin I < 0.015 ng/ml    < 0.015 ng/ml 


 


Hemoglobin  13.0 g/dL  12.7 g/dL  


 


Hematocrit  37.6 %  37.3 %  


 


White Blood Count   12.31 K/uL  


 


Red Blood Count   4.17 M/uL  


 


Mean Corpuscular Volume   89.4 fL  


 


Mean Corpuscular Hemoglobin   30.5 pg  


 


Mean Corpuscular Hemoglobin


Concent 


  


  34.0 g/dl 


  


 


 


Platelet Count   108 K/uL  


 


Mean Platelet Volume   10.3 fL  


 


RDW Standard Deviation   47.1 fL  


 


RDW Coefficient of Variation   14.6 %  


 


Nucleated RBC Absolute Count


(auto) 


  


  0.18 K/uL 


  


 


 


Neutrophils % (Manual)   51.4 %  


 


Band Neutrophils % (Manual)   0.0 %  


 


Lymphocytes % (Manual)   27.9 %  


 


Variant Lymphocytes % (manual)   0.0 %  


 


Monocytes % (Manual)   8.1 %  


 


Eosinophils % (Manual)   3.6 %  


 


Basophils % (Manual)   0.9 %  


 


Metamyelocytes %   3.6 %  


 


Myelocytes %   2.7 %  


 


Blast Cells %   1.8 %  


 


Nucleated Red Blood Cells %   1.5 %  


 


Neutrophils # (Manual)   6.33 K/uL  


 


Total Absolute Neutrophils   6.33 K/uL  


 


Lymphocytes # (Manual)   3.43 K/uL  


 


Total Absolute Lymphocytes   3.43 K/uL  


 


Monocytes # (Manual)   1.00 K/uL  


 


Eosinophils # (Manual)   0.44 K/uL  


 


Basophils # (Manual)   0.11 K/uL  


 


Metamyelocytes #   0.44 K/uL  


 


Myelocytes #   0.33 K/uL  


 


Percent Large Granular


Lymphocytes 


  


  0.0 % 


  


 


 


Blast Cells #   0.22 K/uL  


 


Prothrombin Time    16.4 SECONDS 


 


Prothromb Time International


Ratio 


  


  


  1.6 


 


 


Activated Partial


Thromboplast Time 


  


  


  33.6 SECONDS 


 


 


Partial Thromboplastin Ratio    1.3 


 


Sodium Level    134 mmol/L 


 


Potassium Level    4.1 mmol/L 


 


Chloride Level    98 mmol/L 


 


Carbon Dioxide Level    26 mmol/L 


 


Anion Gap    10.0 mmol/L 


 


Blood Urea Nitrogen    23 mg/dl 


 


Creatinine    1.09 mg/dl 


 


Est Creatinine Clear Calc


Drug Dose 


  


  


  46.0 ml/min 


 


 


Estimated GFR (


American) 


  


  


  69.4 


 


 


Estimated GFR (Non-


American 


  


  


  59.9 


 


 


BUN/Creatinine Ratio    20.9 


 


Random Glucose    82 mg/dl 


 


Calcium Level    9.0 mg/dl 


 


Magnesium Level    1.6 mg/dl 


 


Total Bilirubin    0.8 mg/dl 


 


Direct Bilirubin    0.3 mg/dl 


 


Aspartate Amino Transf


(AST/SGOT) 


  


  


  72 U/L 


 


 


Alanine Aminotransferase


(ALT/SGPT) 


  


  


  20 U/L 


 


 


Alkaline Phosphatase    127 U/L 


 


Total Creatine Kinase    45 U/L 


 


Creatine Kinase MB    2.9 ng/ml 


 


Creatine Kinase MB Ratio    6.4 


 


Total Protein    4.9 gm/dl 


 


Albumin    2.3 gm/dl 


 


Test


  1/9/18


05:56 1/9/18


14:35 


  


 


 


Hemoglobin 13.0 g/dL  13.0 g/dL   


 


Hematocrit 37.8 %  38.5 %   


 


Total Creatine Kinase  56 U/L   


 


Creatine Kinase MB  4.1 ng/ml   


 


Creatine Kinase MB Ratio  7.3   


 


Troponin I  0.112 ng/ml   











Assessment and Plan


upper GI bleeding


-due to stomach ulcers and coumadin coagulopathy





PUD


-stomach ulcers likely asa induced


-pt notes no hx of CAD/cerebrovascular disease - hold indefinitely


-continue protonix





coumadin coagulopathy


-almost certainly the final reason for the bleeding


-reversed





afib, prior DVT


-w ulcers and bleeding above - continue off coumadin for now, but long term 

risk will favor resuming -- follow Hgb and clinical status closely as outpt and 

work to resume coumadin as soon as is safe





peripheral neuropathy


-gabapentin





metastatic prostate cancer


-outpt management





DVT proph 


-ambulation as best as possible - pharmacologic obviously contraindicated due 

to PUD/bleeding

## 2018-01-10 VITALS
SYSTOLIC BLOOD PRESSURE: 130 MMHG | TEMPERATURE: 97.88 F | DIASTOLIC BLOOD PRESSURE: 69 MMHG | OXYGEN SATURATION: 95 % | HEART RATE: 106 BPM

## 2018-01-10 VITALS
SYSTOLIC BLOOD PRESSURE: 134 MMHG | TEMPERATURE: 98.6 F | OXYGEN SATURATION: 95 % | HEART RATE: 103 BPM | DIASTOLIC BLOOD PRESSURE: 83 MMHG

## 2018-01-10 VITALS
SYSTOLIC BLOOD PRESSURE: 125 MMHG | DIASTOLIC BLOOD PRESSURE: 72 MMHG | OXYGEN SATURATION: 92 % | HEART RATE: 105 BPM | TEMPERATURE: 98.42 F

## 2018-01-10 VITALS
HEART RATE: 105 BPM | SYSTOLIC BLOOD PRESSURE: 129 MMHG | TEMPERATURE: 96.8 F | OXYGEN SATURATION: 92 % | DIASTOLIC BLOOD PRESSURE: 72 MMHG

## 2018-01-10 VITALS
DIASTOLIC BLOOD PRESSURE: 81 MMHG | SYSTOLIC BLOOD PRESSURE: 134 MMHG | OXYGEN SATURATION: 96 % | TEMPERATURE: 98.96 F | HEART RATE: 112 BPM

## 2018-01-10 VITALS
OXYGEN SATURATION: 95 % | HEART RATE: 106 BPM | DIASTOLIC BLOOD PRESSURE: 69 MMHG | TEMPERATURE: 97.88 F | SYSTOLIC BLOOD PRESSURE: 130 MMHG

## 2018-01-10 VITALS
SYSTOLIC BLOOD PRESSURE: 132 MMHG | TEMPERATURE: 98.96 F | HEART RATE: 104 BPM | OXYGEN SATURATION: 95 % | DIASTOLIC BLOOD PRESSURE: 69 MMHG

## 2018-01-10 VITALS — OXYGEN SATURATION: 95 %

## 2018-01-10 VITALS
OXYGEN SATURATION: 95 % | TEMPERATURE: 97.34 F | SYSTOLIC BLOOD PRESSURE: 125 MMHG | DIASTOLIC BLOOD PRESSURE: 72 MMHG | HEART RATE: 107 BPM

## 2018-01-10 LAB
BUN SERPL-MCNC: 22 MG/DL (ref 7–18)
CALCIUM SERPL-MCNC: 9.1 MG/DL (ref 8.5–10.1)
CO2 SERPL-SCNC: 22 MMOL/L (ref 21–32)
CREAT SERPL-MCNC: 1.2 MG/DL (ref 0.6–1.4)
EOSINOPHIL NFR BLD AUTO: 102 K/UL (ref 130–400)
GLUCOSE SERPL-MCNC: 78 MG/DL (ref 70–99)
HCT VFR BLD CALC: 37.6 % (ref 42–52)
HGB BLD-MCNC: 12.5 G/DL (ref 14–18)
INR PPP: 1.1 (ref 0.9–1.1)
MCH RBC QN AUTO: 30 PG (ref 25–34)
MCHC RBC AUTO-ENTMCNC: 33.2 G/DL (ref 32–36)
MCV RBC AUTO: 90.2 FL (ref 80–100)
NRBC BLD AUTO-RTO: 1.8 %
NUCLEATED RED BLOOD CELL ABS: 0.27 K/UL (ref 0–0)
PMV BLD AUTO: 10.7 FL (ref 7.4–10.4)
POTASSIUM SERPL-SCNC: 4.2 MMOL/L (ref 3.5–5.1)
RED CELL DISTRIBUTION WIDTH CV: 14.8 % (ref 11.5–14.5)
RED CELL DISTRIBUTION WIDTH SD: 48.1 FL (ref 36.4–46.3)
SODIUM SERPL-SCNC: 137 MMOL/L (ref 136–145)
WBC # BLD AUTO: 14.82 K/UL (ref 4.8–10.8)

## 2018-01-10 RX ADMIN — PANTOPRAZOLE SCH MG: 40 TABLET, DELAYED RELEASE ORAL at 19:56

## 2018-01-10 RX ADMIN — SUCRALFATE SCH GM: 1 SUSPENSION ORAL at 12:47

## 2018-01-10 RX ADMIN — MORPHINE SULFATE PRN MG: 2 INJECTION, SOLUTION INTRAMUSCULAR; INTRAVENOUS at 12:46

## 2018-01-10 RX ADMIN — PANTOPRAZOLE SCH MG: 40 TABLET, DELAYED RELEASE ORAL at 09:10

## 2018-01-10 RX ADMIN — SUCRALFATE SCH GM: 1 SUSPENSION ORAL at 16:17

## 2018-01-10 RX ADMIN — MORPHINE SULFATE PRN MG: 2 INJECTION, SOLUTION INTRAMUSCULAR; INTRAVENOUS at 16:21

## 2018-01-10 RX ADMIN — SUCRALFATE SCH GM: 1 SUSPENSION ORAL at 09:10

## 2018-01-10 RX ADMIN — SUCRALFATE SCH GM: 1 SUSPENSION ORAL at 19:57

## 2018-01-10 RX ADMIN — MORPHINE SULFATE PRN MG: 2 INJECTION, SOLUTION INTRAMUSCULAR; INTRAVENOUS at 23:40

## 2018-01-10 NOTE — PROGRESS NOTE
Subjective


Date of Service:


Nasir 10, 2018.


Subjective


Pt evaluation today including:  conversation w/ patient, conversation w/ family

, physical exam, chart review, lab review, review of studies, conversation w/ 

consultant, review of inpatient medication list


d/w heme onc, d/w pathology and looked at slides with pathology guidance





feeling better less stomach pain ate clears well feels he can try full liquids 

but afraid to move too fast on eating advancement.


discussed pathology report - family expresses understanding, oncology aware and 

has discussed case.  





no further bleeding no other sx.





Problem List


Medical Problems:


(1) Elevated INR


Status: Acute  





(2) Epigastric abdominal pain


Status: Acute  





(3) GI bleed


Status: Acute  





(4) Precordial chest pain


Status: Acute  





(5) Substernal chest pain


Status: Acute  





(6) Substernal precordial chest pain


Status: Acute  





(7) Superficial abrasion


Status: Acute  





(8) Supratherapeutic INR


Status: Acute  











Review of Systems


all other ROS otherwise negative except for as above





Objective


Vital Signs











  Date Time  Temp Pulse Resp B/P (MAP) Pulse Ox O2 Delivery O2 Flow Rate FiO2


 


1/10/18 17:45 36.0 105 24 129/72 (91) 92 Room Air  


 


1/10/18 17:17 36.6 106 31  95   


 


1/10/18 16:00      Room Air  


 


1/10/18 15:18 36.6 106 31 130/69 (89) 95 Room Air  


 


1/10/18 12:00      Room Air  


 


1/10/18 11:33 37.2 112 19 134/81 (98) 96 Room Air  


 


1/10/18 08:03 36.3 107 19 125/72 (89) 95 Room Air  


 


1/10/18 08:00      Room Air  


 


1/10/18 04:00      Room Air  


 


1/10/18 03:39 37.2 104 24 132/69 (90) 95 Room Air  


 


1/10/18 00:00      Room Air  


 


1/9/18 20:00      Room Air  


 


1/9/18 19:26 36.8 119 23 121/65 (83) 94 Room Air  











Physical Exam


General Appearance:  no apparent distress


Eyes:  EOMI


ENT:  hearing grossly normal


Neck:  trachea midline


Respiratory/Chest:  no respiratory distress, no accessory muscle use


Extremities:  normal range of motion


Neurologic/Psychiatric:  CNs II-XII nml as tested, alert, normal mood/affect


Skin:  normal color, warm/dry





Laboratory Results





Last 24 Hours








Test


  1/9/18


20:24 1/10/18


06:15


 


Troponin I 0.105 ng/ml  


 


White Blood Count  14.82 K/uL 


 


Red Blood Count  4.17 M/uL 


 


Hemoglobin  12.5 g/dL 


 


Hematocrit  37.6 % 


 


Mean Corpuscular Volume  90.2 fL 


 


Mean Corpuscular Hemoglobin  30.0 pg 


 


Mean Corpuscular Hemoglobin


Concent 


  33.2 g/dl 


 


 


Platelet Count  102 K/uL 


 


Mean Platelet Volume  10.7 fL 


 


RDW Standard Deviation  48.1 fL 


 


RDW Coefficient of Variation  14.8 % 


 


Nucleated RBC Absolute Count


(auto) 


  0.27 K/uL 


 


 


Neutrophils % (Manual)  60.3 % 


 


Lymphocytes % (Manual)  8.6 % 


 


Variant Lymphocytes % (manual)  13.8 % 


 


Monocytes % (Manual)  3.4 % 


 


Eosinophils % (Manual)  0.9 % 


 


Basophils % (Manual)  1.7 % 


 


Metamyelocytes %  7.8 % 


 


Myelocytes %  2.6 % 


 


Blast Cells %  0.9 % 


 


Nucleated Red Blood Cells %  1.8 % 


 


Neutrophils # (Manual)  8.94 K/uL 


 


Total Absolute Neutrophils  8.94 K/uL 


 


Lymphocytes # (Manual)  1.27 K/uL 


 


Absolute Variant Lymphocytes  2.05 K/uL 


 


Total Absolute Lymphocytes  3.32 K/uL 


 


Monocytes # (Manual)  0.50 K/uL 


 


Eosinophils # (Manual)  0.13 K/uL 


 


Basophils # (Manual)  0.25 K/uL 


 


Metamyelocytes #  1.16 K/uL 


 


Myelocytes #  0.39 K/uL 


 


Blast Cells #  0.13 K/uL 


 


Red Blood Cell Morphology  Unremarkable 


 


Prothrombin Time  11.9 SECONDS 


 


Prothromb Time International


Ratio 


  1.1 


 


 


Sodium Level  137 mmol/L 


 


Potassium Level  4.2 mmol/L 


 


Chloride Level  100 mmol/L 


 


Carbon Dioxide Level  22 mmol/L 


 


Anion Gap  15.0 mmol/L 


 


Blood Urea Nitrogen  22 mg/dl 


 


Creatinine  1.20 mg/dl 


 


Est Creatinine Clear Calc


Drug Dose 


  41.8 ml/min 


 


 


Estimated GFR (


American) 


  61.8 


 


 


Estimated GFR (Non-


American 


  53.3 


 


 


BUN/Creatinine Ratio  18.2 


 


Random Glucose  78 mg/dl 


 


Calcium Level  9.1 mg/dl 











Assessment and Plan


upper GI bleeding


-due to stomach ulcers and coumadin coagulopathy -- underlying lymphoma also 

almost certainly playing a role, although appears that aspirin and coumadin 

still were significant factors





PUD


-stomach ulcers likely asa induced but superimposed on lymphoma


-pt notes no hx of CAD/cerebrovascular disease - hold indefinitely


-continue protonix





coumadin coagulopathy


-almost certainly the final reason for the bleeding


-reversed





lymphoma


-appears to have occurence on stomach, prior adenopathy from previous CT ?

related


-oncology following, awaiting more data to discuss treatment options


-peripheral blood also showing findings concerning





afib, prior DVT


-w ulcers and bleeding above - continue off coumadin for now, but long term 

risk will favor resuming -- follow Hgb and clinical status closely as outpt and 

work to resume coumadin as soon as is safe -although this will be "work in 

progress" given not only the stomach ulcers but the lymphoma





peripheral neuropathy


-gabapentin





metastatic prostate cancer


-outpt management





DVT proph 


-ambulation as best as possible - pharmacologic obviously contraindicated due 

to PUD/bleeding

## 2018-01-10 NOTE — ONCOLOGY CONSULTATION
Oncology/Heme Consultation


Date of Consultation:


Nasir 10, 2018.


Attending Physician:


Erlin Swift D.O.


Reason for Consultation:


GI bleeding


Mantle cell lymphoma


Prostate cancer


History of Present Illness


Mr. Bradford is an 89 year old man with a history of prostate cancer in the 1990s 

that is now biochemically recurrent with questionable metastatic disease. He 

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 He's been 

in remission for the last 2 years, though recent CTs revealed some unexplained 

small bowel wall thickening that was suspicious for relapse. He was admitted 

this stay for melenic stools and a modest drop in his hemoglobin. He was found 

to be on NSAIDs and to have a supratherapeutic INR that was corrected. An EGD 

revealed multiple gastric ulcers that were biopsied. Pathology from these 

biopsies unexpectedly revealed a high-grade non-Hodgkin lymphoma with a Ki-67 

of 100% and an immunohistochemical profile not consistent with his prior mantle 

cell lymphoma. He has no other symptoms at this time, aside from the GI bleed. 

He had a recent sodium fluoride PET that showed diffuse marrow activity but no 

focal lesions.





Past Medical/Surgical History


Medical Problems:


(1) Elevated INR


Status: Acute  





(2) Epigastric abdominal pain


Status: Acute  





(3) GI bleed


Status: Acute  





(4) Precordial chest pain


Status: Acute  





(5) Substernal chest pain


Status: Acute  





(6) Substernal precordial chest pain


Status: Acute  





(7) Superficial abrasion


Status: Acute  





(8) Supratherapeutic INR


Status: Acute  











Family History





Diabetes mellitus


FH: cancer


FH: heart disease


FH: lung disease


Hypertension





Social History


Smoking Status:  Never Smoker


Smokeless Tobacco Use:  No


Alcohol Use:  none


Drug Use:  none


Marital Status:  


Housing Status:  lives alone


Occupation Status:  unemployed





Allergies


Coded Allergies:  


     Acetazolamide (Verified  Allergy, Unknown, Diamox Eye Drops ? rxn, 1/2/18)





Home Medications


Scheduled


Amiodarone Hcl (Cordarone), 200 MG PO BID


Aspirin (Aspirin Ec), 81 MG PO QAM


Bicalutamide (Casodex), 50 MG PO QAM


Calcium Carbonate-Vitamin D (Oyster Shell Calcium/D3 500-400 mg-Unit), 2 TABS 

PO DAILY


Gabapentin (Neurontin), 300 MG PO BID


Leuprolide Acetate (Lupron Depot), 30 MG IM Q4MO


Metoprolol Tartrate (Lopressor), 12.5 MG PO BID


Multivitamin (Multivitamin), 1 TAB PO QAM


Omega-3 Fatty Acids (Fish Oil 1200 mg), 1,200 MG PO DAILY


Ranitidine (Zantac), 150 MG PO BID


Warfarin Sodium (Coumadin), 1 TAB PO 6XWK


Warfarin Sodium (Warfarin Sodium), 7.5 MG PO WK





Scheduled PRN


Furosemide (Furosemide), 20-40 MG PO DAILY PRN for "DEPENDS ON WT GAIN".





Current Inpatient Medications





Current Inpatient Medications








 Medications


  (Trade)  Dose


 Ordered  Sig/Maria Ines


 Route  Start Time


 Stop Time Status Last Admin


Dose Admin


 


 Nitroglycerin


  (Nitrostat Tab)  0.4 mg  UD  PRN


 SL  1/8/18 20:30


 2/7/18 20:29   


 


 


 Ondansetron HCl


  (Zofran Odt)  8 mg  Q6H  PRN


 PO  1/8/18 20:45


 2/7/18 20:44  1/8/18 22:36


8 MG


 


 Morphine Sulfate


  (MoRPHine


 SULFATE INJ)  2 mg  Q2H  PRN


 IV  1/8/18 20:45


 1/22/18 20:44  1/10/18 16:21


2 MG


 


 Pantoprazole


 Sodium


  (Protonix Tab)  40 mg  BID


 PO  1/9/18 21:00


 2/8/18 20:59  1/10/18 09:10


40 MG


 


 Sucralfate


  (Carafate Susp)  1 gm  QID


 PO  1/9/18 17:00


 2/8/18 16:59  1/10/18 16:17


1 GM


 


 Metoprolol


 Succinate


  (Toprol Xl Tab)  25 mg  QAM


 PO  1/11/18 08:00


 2/10/18 08:59   


 











Review of Systems


Constitutional:  + weakness, + fatigue


ENT:  No unusual epistaxis


Respiratory:  No cough, No shortness of breath


Cardiovascular:  No chest pain


Abdomen:  No pain


Musculoskeletal:  No joint pain, No muscle pain


Hematologic / Lymphatic:  No abnormal bleeding/bruising, No swollen lymph nodes

, No night sweats





Physical Exam











  Date Time  Temp Pulse Resp B/P (MAP) Pulse Ox O2 Delivery O2 Flow Rate FiO2


 


1/10/18 17:17 36.6 106 31  95   


 


1/10/18 16:00      Room Air  


 


1/10/18 15:18 36.6 106 31 130/69 (89) 95 Room Air  


 


1/10/18 12:00      Room Air  


 


1/10/18 11:33 37.2 112 19 134/81 (98) 96 Room Air  


 


1/10/18 08:03 36.3 107 19 125/72 (89) 95 Room Air  


 


1/10/18 08:00      Room Air  


 


1/10/18 04:00      Room Air  


 


1/10/18 03:39 37.2 104 24 132/69 (90) 95 Room Air  


 


1/10/18 00:00      Room Air  


 


1/9/18 20:00      Room Air  


 


1/9/18 19:26 36.8 119 23 121/65 (83) 94 Room Air  








General Appearance:  + pertinent finding (Chronically ill-appearing and weak)


Eyes:  sclerae normal (anicteric)


ENT:  pharynx normal (no bleeding)


Respiratory/Chest:  lungs clear


Cardiovascular:  regular rate, rhythm


Abdomen/GI:  non tender, soft, no organomegaly


Extremities/Musculoskelatal:  no pedal edema


Neurologic/Psych:  alert, oriented x 3


Skin:  no rash





Laboratory Results





Last 24 Hours








Test


  1/9/18


20:24 1/10/18


06:15


 


Troponin I 0.105 ng/ml  


 


White Blood Count  14.82 K/uL 


 


Red Blood Count  4.17 M/uL 


 


Hemoglobin  12.5 g/dL 


 


Hematocrit  37.6 % 


 


Mean Corpuscular Volume  90.2 fL 


 


Mean Corpuscular Hemoglobin  30.0 pg 


 


Mean Corpuscular Hemoglobin


Concent 


  33.2 g/dl 


 


 


Platelet Count  102 K/uL 


 


Mean Platelet Volume  10.7 fL 


 


RDW Standard Deviation  48.1 fL 


 


RDW Coefficient of Variation  14.8 % 


 


Nucleated RBC Absolute Count


(auto) 


  0.27 K/uL 


 


 


Neutrophils % (Manual)  60.3 % 


 


Lymphocytes % (Manual)  8.6 % 


 


Variant Lymphocytes % (manual)  13.8 % 


 


Monocytes % (Manual)  3.4 % 


 


Eosinophils % (Manual)  0.9 % 


 


Basophils % (Manual)  1.7 % 


 


Metamyelocytes %  7.8 % 


 


Myelocytes %  2.6 % 


 


Blast Cells %  0.9 % 


 


Nucleated Red Blood Cells %  1.8 % 


 


Neutrophils # (Manual)  8.94 K/uL 


 


Total Absolute Neutrophils  8.94 K/uL 


 


Lymphocytes # (Manual)  1.27 K/uL 


 


Absolute Variant Lymphocytes  2.05 K/uL 


 


Total Absolute Lymphocytes  3.32 K/uL 


 


Monocytes # (Manual)  0.50 K/uL 


 


Eosinophils # (Manual)  0.13 K/uL 


 


Basophils # (Manual)  0.25 K/uL 


 


Metamyelocytes #  1.16 K/uL 


 


Myelocytes #  0.39 K/uL 


 


Blast Cells #  0.13 K/uL 


 


Red Blood Cell Morphology  Unremarkable 


 


Prothrombin Time  11.9 SECONDS 


 


Prothromb Time International


Ratio 


  1.1 


 


 


Sodium Level  137 mmol/L 


 


Potassium Level  4.2 mmol/L 


 


Chloride Level  100 mmol/L 


 


Carbon Dioxide Level  22 mmol/L 


 


Anion Gap  15.0 mmol/L 


 


Blood Urea Nitrogen  22 mg/dl 


 


Creatinine  1.20 mg/dl 


 


Est Creatinine Clear Calc


Drug Dose 


  41.8 ml/min 


 


 


Estimated GFR (


American) 


  61.8 


 


 


Estimated GFR (Non-


American 


  53.3 


 


 


BUN/Creatinine Ratio  18.2 


 


Random Glucose  78 mg/dl 


 


Calcium Level  9.1 mg/dl 











Assessment & Plan


Mr. Bradford is in a very challenging situation. He has what appears to be a 

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 He also has diffuse 

marrow abnormality on imaging and a CBC consistent with myelophthisis, which 

would also fit with an aggressive recurrence of his disease. The treatment for 

such a lymphoma would involve multi-agent chemotherapy, but I worry he wouldn't 

tolerate this well given his age and other medical issues. He may need a bone 

marrow biopsy to better characterize what's going on, though I'm not sure he'll 

be up for it. I will discuss this with him tomorrow. We may also need to have a 

meeting with his family to discuss his goals of care, as he may be better 

served by hospice care.

## 2018-01-10 NOTE — GASTROENTEROLOGY PROGRESS NOTE
Progress Note


Date of Service:  Nasir 10, 2018


Subjective


Pt evaluation today including:  conversation w/ patient, physical exam, lab 

review, review of studies


Patient is an 89 yo male with anemia & melena who underwent an EGD on 1/9/18 

that indicated 6 gastric ulcers. The patient is off his Coumadin and aspirin at 

the moment. His H/H is stable at 12.5/37.6. He denies any further melena. He 

reports that he does not feel any epigastric pain, heartburn, reflux, or 

burning at the present time. He offers no further GI complaints at this time. 

His troponins are slightly bumped today. Cardiology and the primary team are 

following. Demand ischemia is suspected.





Review of Systems


Constitutional:  No fever, No chills


Respiratory:  No cough, No shortness of breath


Cardiac:  No chest pain


Abdomen:  No pain, No nausea, No vomiting, No diarrhea, No constipation, No GI 

bleeding


Skin:  No problem reported





Medications





Current Inpatient Medications








 Medications


  (Trade)  Dose


 Ordered  Sig/Maria Ines


 Route  Start Time


 Stop Time Status Last Admin


Dose Admin


 


 Potassium


 Chloride/Sodium


 Chloride  1,000 ml @ 


 50 mls/hr  Q20H


 IV  1/8/18 22:30


 2/7/18 20:26  1/9/18 18:09


50 MLS/HR


 


 Nitroglycerin


  (Nitrostat Tab)  0.4 mg  UD  PRN


 SL  1/8/18 20:30


 2/7/18 20:29   


 


 


 Ondansetron HCl


  (Zofran Odt)  8 mg  Q6H  PRN


 PO  1/8/18 20:45


 2/7/18 20:44  1/8/18 22:36


8 MG


 


 Morphine Sulfate


  (MoRPHine


 SULFATE INJ)  2 mg  Q2H  PRN


 IV  1/8/18 20:45


 1/22/18 20:44  1/9/18 23:19


2 MG


 


 Pantoprazole


 Sodium


  (Protonix Tab)  40 mg  BID


 PO  1/9/18 21:00


 2/8/18 20:59  1/9/18 19:23


40 MG


 


 Sucralfate


  (Carafate Susp)  1 gm  QID


 PO  1/9/18 17:00


 2/8/18 16:59  1/9/18 19:23


1 GM











Objective


Vital Signs











  Date Time  Temp Pulse Resp B/P (MAP) Pulse Ox O2 Delivery O2 Flow Rate FiO2


 


1/10/18 08:03 36.3 107 19 125/72 (89) 95 Room Air  


 


1/10/18 08:00      Room Air  


 


1/10/18 04:00      Room Air  


 


1/10/18 03:39 37.2 104 24 132/69 (90) 95 Room Air  


 


1/10/18 00:00      Room Air  


 


1/9/18 20:00      Room Air  


 


1/9/18 19:26 36.8 119 23 121/65 (83) 94 Room Air  


 


1/9/18 16:32  113 25 162/89 (105)    


 


1/9/18 16:30  109 20     


 


1/9/18 16:16  111 22 108/76 (78)    


 


1/9/18 16:15  106 21     


 


1/9/18 16:01  100 22 124/70 (80) 94   


 


1/9/18 16:00  111 22  95   


 


1/9/18 16:00      Room Air  


 


1/9/18 15:46  108 24 129/81 (105) 90   


 


1/9/18 15:45  112 20  92   


 


1/9/18 15:31  109 19 135/76 (99) 92   


 


1/9/18 15:30  99 22  94   


 


1/9/18 15:16  101 22 139/81 (89)    


 


1/9/18 15:15  109 19     


 


1/9/18 15:15 36.4 95 23 139/81 (100) 92 Room Air  


 


1/9/18 15:01  105 23 135/80 (90)    


 


1/9/18 15:00  109 26     


 


1/9/18 14:39 36.6 108 18 124/75 (91) 95 Room Air  


 


1/9/18 14:03  98 18 127/80 (96) 94 Room Air  


 


1/9/18 13:48  105 18 130/72 (91) 96 Room Air  


 


1/9/18 12:45 36.5 95 18 108/74 (85) 98 Room Air  


 


1/9/18 12:00      Room Air  


 


1/9/18 12:00 37.0 116 22 122/65 (84) 93 Room Air  











Physical Exam


General Appearance:  WD/WN, no apparent distress


Eyes:  normal inspection, PERRL


Respiratory/Chest:  lungs clear


Cardiovascular:  + irregularly irregular


Abdomen:  normal bowel sounds, non tender, soft


Extremities:  non-tender


Neurologic/Psych:  alert, oriented x 3





Laboratory Results





Last 24 Hours








Test


  1/9/18


14:35 1/9/18


20:24 1/10/18


06:15


 


Hemoglobin 13.0 g/dL   12.5 g/dL 


 


Hematocrit 38.5 %   37.6 % 


 


Total Creatine Kinase 56 U/L   


 


Creatine Kinase MB 4.1 ng/ml   


 


Creatine Kinase MB Ratio 7.3   


 


Troponin I 0.112 ng/ml  0.105 ng/ml  


 


White Blood Count   14.82 K/uL 


 


Red Blood Count   4.17 M/uL 


 


Mean Corpuscular Volume   90.2 fL 


 


Mean Corpuscular Hemoglobin   30.0 pg 


 


Mean Corpuscular Hemoglobin


Concent 


  


  33.2 g/dl 


 


 


Platelet Count   102 K/uL 


 


Mean Platelet Volume   10.7 fL 


 


RDW Standard Deviation   48.1 fL 


 


RDW Coefficient of Variation   14.8 % 


 


Nucleated RBC Absolute Count


(auto) 


  


  0.27 K/uL 


 


 


Neutrophils % (Manual)   60.3 % 


 


Lymphocytes % (Manual)   8.6 % 


 


Variant Lymphocytes % (manual)   13.8 % 


 


Monocytes % (Manual)   3.4 % 


 


Eosinophils % (Manual)   0.9 % 


 


Basophils % (Manual)   1.7 % 


 


Metamyelocytes %   7.8 % 


 


Myelocytes %   2.6 % 


 


Blast Cells %   0.9 % 


 


Nucleated Red Blood Cells %   1.8 % 


 


Neutrophils # (Manual)   8.94 K/uL 


 


Total Absolute Neutrophils   8.94 K/uL 


 


Lymphocytes # (Manual)   1.27 K/uL 


 


Absolute Variant Lymphocytes   2.05 K/uL 


 


Total Absolute Lymphocytes   3.32 K/uL 


 


Monocytes # (Manual)   0.50 K/uL 


 


Eosinophils # (Manual)   0.13 K/uL 


 


Basophils # (Manual)   0.25 K/uL 


 


Metamyelocytes #   1.16 K/uL 


 


Myelocytes #   0.39 K/uL 


 


Blast Cells #   0.13 K/uL 


 


Red Blood Cell Morphology   Unremarkable 


 


Prothrombin Time   11.9 SECONDS 


 


Prothromb Time International


Ratio 


  


  1.1 


 


 


Sodium Level   137 mmol/L 


 


Potassium Level   4.2 mmol/L 


 


Chloride Level   100 mmol/L 


 


Carbon Dioxide Level   22 mmol/L 


 


Anion Gap   15.0 mmol/L 


 


Blood Urea Nitrogen   22 mg/dl 


 


Creatinine   1.20 mg/dl 


 


Est Creatinine Clear Calc


Drug Dose 


  


  41.8 ml/min 


 


 


Estimated GFR (


American) 


  


  61.8 


 


 


Estimated GFR (Non-


American 


  


  53.3 


 


 


BUN/Creatinine Ratio   18.2 


 


Random Glucose   78 mg/dl 


 


Calcium Level   9.1 mg/dl 











Assessment and Plan


Patient is an 89 yo male found to have 6 gastric ulcers during EGD on 1/9/18. 

His H/H is stable and he has had no further episodes of melena. 





1) Protonix 40 mg BID. 


2) Carafate 1 gm four times daily prior to meals and at bedtime. 


3) Repeat EGD in 8 weeks to assess for healing. 


4) Supportive care and further monitoring of H/H per primary team. 





Addendum: After my evaluation of the patient, Dr. Ayala received a call from Dr. Benavides of pathology that biopsies obtained from the EGD were consistent with a 

high grade lymphoma. Will place a consult for oncology for further evaluation 

and recommendations. I did return to the patient's room to discuss these 

results with him. 





Agree with MARII Lang as above


Abd:  Soft, NT, ND, +BS


Continue current therapy


Await input from Oncology for further evaluation and recommendations.

## 2018-01-10 NOTE — MEDICAL STUDENT: [facility]
Med Student Progress Note


Date of Service


Nasir 10, 2018.





Subjective


Pt evaluation today including:  conversation w/ patient, conversation w/ family


Patient stated that he fell asleep early last  night at 7pm, but then woke an 

hour later and was up for some time due to a staff meeting near the outside of 

his room.  He believes he slept well after falling back to sleep around 11pm.  

He told me he woke up confused and did not know where he was for a little while 

as he was waking up this morning.  I had the opportunity to speak to his 

daughter towards the end of my visit.  She said that this had happened after he 

had general anesthesia after surgery on his prostate.  It is better at this 

incident likely do to the different anesthetic used for his EGD yesterday.  At 

the time of my visit, he is oriented, but does have poor memory of knowing who 

came in the room to discuss his medical concerns with him today.  He said he 

was having back pain today but the pain is much better as he took something for 

the pain within the hour of my visit.  He has not had any bloody stools or 

other symptoms of concern today.  He did not express presence of abdominal pain.





Review of Systems


Cardiac:  No chest pain


Abdomen:  No pain, No nausea, No vomiting, No diarrhea, No constipation, No GI 

bleeding





Objective


Vital Signs











  Date Time  Temp Pulse Resp B/P (MAP) Pulse Ox O2 Delivery O2 Flow Rate FiO2


 


1/10/18 12:00      Room Air  


 


1/10/18 11:33 37.2 112 19 134/81 (98) 96 Room Air  


 


1/10/18 08:03 36.3 107 19 125/72 (89) 95 Room Air  


 


1/10/18 08:00      Room Air  


 


1/10/18 04:00      Room Air  


 


1/10/18 03:39 37.2 104 24 132/69 (90) 95 Room Air  


 


1/10/18 00:00      Room Air  


 


1/9/18 20:00      Room Air  


 


1/9/18 19:26 36.8 119 23 121/65 (83) 94 Room Air  


 


1/9/18 16:32  113 25 162/89 (105)    


 


1/9/18 16:30  109 20     


 


1/9/18 16:16  111 22 108/76 (78)    


 


1/9/18 16:15  106 21     


 


1/9/18 16:01  100 22 124/70 (80) 94   


 


1/9/18 16:00  111 22  95   


 


1/9/18 16:00      Room Air  


 


1/9/18 15:46  108 24 129/81 (105) 90   


 


1/9/18 15:45  112 20  92   


 


1/9/18 15:31  109 19 135/76 (99) 92   


 


1/9/18 15:30  99 22  94   


 


1/9/18 15:16  101 22 139/81 (89)    


 


1/9/18 15:15  109 19     


 


1/9/18 15:15 36.4 95 23 139/81 (100) 92 Room Air  


 


1/9/18 15:01  105 23 135/80 (90)    


 


1/9/18 15:00  109 26     


 


1/9/18 14:39 36.6 108 18 124/75 (91) 95 Room Air  











Physical Exam


General Appearance:  WD/WN, + mild distress


Respiratory/Chest:  chest non-tender, lungs clear, no respiratory distress, no 

accessory muscle use, + decreased breath sounds


Cardiovascular:  no edema, no gallop, no JVD, + irregularly irregular





Laboratory Results





Last 24 Hours








Test


  1/9/18


14:35 1/9/18


20:24 1/10/18


06:15


 


Hemoglobin 13.0 g/dL   12.5 g/dL 


 


Hematocrit 38.5 %   37.6 % 


 


Total Creatine Kinase 56 U/L   


 


Creatine Kinase MB 4.1 ng/ml   


 


Creatine Kinase MB Ratio 7.3   


 


Troponin I 0.112 ng/ml  0.105 ng/ml  


 


White Blood Count   14.82 K/uL 


 


Red Blood Count   4.17 M/uL 


 


Mean Corpuscular Volume   90.2 fL 


 


Mean Corpuscular Hemoglobin   30.0 pg 


 


Mean Corpuscular Hemoglobin


Concent 


  


  33.2 g/dl 


 


 


Platelet Count   102 K/uL 


 


Mean Platelet Volume   10.7 fL 


 


RDW Standard Deviation   48.1 fL 


 


RDW Coefficient of Variation   14.8 % 


 


Nucleated RBC Absolute Count


(auto) 


  


  0.27 K/uL 


 


 


Neutrophils % (Manual)   60.3 % 


 


Lymphocytes % (Manual)   8.6 % 


 


Variant Lymphocytes % (manual)   13.8 % 


 


Monocytes % (Manual)   3.4 % 


 


Eosinophils % (Manual)   0.9 % 


 


Basophils % (Manual)   1.7 % 


 


Metamyelocytes %   7.8 % 


 


Myelocytes %   2.6 % 


 


Blast Cells %   0.9 % 


 


Nucleated Red Blood Cells %   1.8 % 


 


Neutrophils # (Manual)   8.94 K/uL 


 


Total Absolute Neutrophils   8.94 K/uL 


 


Lymphocytes # (Manual)   1.27 K/uL 


 


Absolute Variant Lymphocytes   2.05 K/uL 


 


Total Absolute Lymphocytes   3.32 K/uL 


 


Monocytes # (Manual)   0.50 K/uL 


 


Eosinophils # (Manual)   0.13 K/uL 


 


Basophils # (Manual)   0.25 K/uL 


 


Metamyelocytes #   1.16 K/uL 


 


Myelocytes #   0.39 K/uL 


 


Blast Cells #   0.13 K/uL 


 


Red Blood Cell Morphology   Unremarkable 


 


Prothrombin Time   11.9 SECONDS 


 


Prothromb Time International


Ratio 


  


  1.1 


 


 


Sodium Level   137 mmol/L 


 


Potassium Level   4.2 mmol/L 


 


Chloride Level   100 mmol/L 


 


Carbon Dioxide Level   22 mmol/L 


 


Anion Gap   15.0 mmol/L 


 


Blood Urea Nitrogen   22 mg/dl 


 


Creatinine   1.20 mg/dl 


 


Est Creatinine Clear Calc


Drug Dose 


  


  41.8 ml/min 


 


 


Estimated GFR (


American) 


  


  61.8 


 


 


Estimated GFR (Non-


American 


  


  53.3 


 


 


BUN/Creatinine Ratio   18.2 


 


Random Glucose   78 mg/dl 


 


Calcium Level   9.1 mg/dl 











Medications





Current Inpatient Medications








 Medications


  (Trade)  Dose


 Ordered  Sig/Maria Ines


 Route  Start Time


 Stop Time Status Last Admin


Dose Admin


 


 Potassium


 Chloride/Sodium


 Chloride  1,000 ml @ 


 50 mls/hr  Q20H


 IV  1/8/18 22:30


 2/7/18 20:26  1/9/18 18:09


50 MLS/HR


 


 Nitroglycerin


  (Nitrostat Tab)  0.4 mg  UD  PRN


 SL  1/8/18 20:30


 2/7/18 20:29   


 


 


 Ondansetron HCl


  (Zofran Odt)  8 mg  Q6H  PRN


 PO  1/8/18 20:45


 2/7/18 20:44  1/8/18 22:36


8 MG


 


 Morphine Sulfate


  (MoRPHine


 SULFATE INJ)  2 mg  Q2H  PRN


 IV  1/8/18 20:45


 1/22/18 20:44  1/10/18 12:46


2 MG


 


 Pantoprazole


 Sodium


  (Protonix Tab)  40 mg  BID


 PO  1/9/18 21:00


 2/8/18 20:59  1/10/18 09:10


40 MG


 


 Sucralfate


  (Carafate Susp)  1 gm  QID


 PO  1/9/18 17:00


 2/8/18 16:59  1/10/18 12:47


1 GM


 


 Metoprolol


 Succinate


  (Toprol Xl Tab)  25 mg  QAM


 PO  1/11/18 09:00


 2/10/18 08:59   


 











Assessment and Plan


Assessment and Plan:


A/P:


Elevated INR


-recived Vit. K injection for warfarin reversal 


-Discontinue warfarin for a short period of time to give ulcers time to heal 

before -u-u-h-v-h-p-u-i-n-g- -i-n- -a- -f-e-w- -w-e-e-k-s--/--m-o-n-t-h-s-.-  

reinitiating in approx. a week's time.





Stomach ulcers


-EGD performed yesterday showed 6 gastric ulcers


-Continue PPI


-Follow up with Hbg/Hct before discharge and again 1 week after discharge from 

hospital


-Was taking an 81 mg Aspirin, this is to be discontinued for the time being


-Can start full liquid diet today, and if tolerated, full diet tomorrow


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 

lymphoma.  Heme/Onc consulted and will wait for their input.





Elevated Troponin I and CK-MB


-Monitor for worsening symptoms


-Repeat labs until peak determined - Troponin did peak at 0.112 and declined.  

this mild elevation is considered consistent with demand ischemia








-

## 2018-01-10 NOTE — CARDIOLOGY PROGRESS NOTE
DATE: 01/10/2018

 

SUBJECTIVE:  Mr. Bradford is resting comfortably in bed without complaints of

chest pain or dyspnea.  He does note mild epigastric discomfort, which has

been constant.

 

OBJECTIVE:

VITAL SIGNS:  Blood pressure is 134/81 with an irregular pulse of 100. 

Respiratory rate is 18 and the patient is afebrile at 37.2 degree Celsius. 

Saturations 96% on room air.

NECK:  Supple with full carotid upstrokes.  There are no carotid bruits. 

Jugular venous pressure is flat at 90 degrees.  There is no thyromegaly.

CARDIOVASCULAR:  Reveals an irregular rhythm with distant heart sounds.  No

obvious murmurs.

LUNGS:  Clear without rales, rhonchi, or wheezes.

ABDOMEN:  Soft with some tenderness in the mid epigastrium.  Bowel sounds are

normal.

EXTREMITIES:  Reveal intact radial artery pulses bilaterally.  There is no

peripheral edema.

 

DATA:  CBC notes hemoglobin of 12.5, hematocrit 37.6, white count 14.8, and

platelet count 102,000.  Electrolytes note a sodium of 137, potassium 4.2,

chloride 100, bicarbonate 22, BUN 22, creatinine 1.2, glucose 78.  Troponin I

level mildly elevated at 0.105.  Telemetry monitor notes atrial fibrillation

with a borderline ventricular response.

 

IMPRESSION AND PLAN:

1.  Peptic ulcer disease - identified at the time of

esophagogastroduodenoscopy yesterday.  Likely secondary to aspirin which has

been discontinued indefinitely.

2.  Persistent atrial fibrillation - tolerating amiodarone without

difficulty.  Would restart metoprolol at 12.5 mg b.i.d. as ventricular

response is somewhat elevated.

3.  Chronic systolic congestive heart failure - compensated.

4.  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

5.  Mild to moderate aortic insufficiency.

6.  History of right popliteal thrombosis - August 2015 - Coumadin started at

that time.

7.  History of mantle cell lymphoma.

8.  Prostate carcinoma.

## 2018-01-11 VITALS
TEMPERATURE: 98.06 F | SYSTOLIC BLOOD PRESSURE: 132 MMHG | OXYGEN SATURATION: 96 % | HEART RATE: 106 BPM | DIASTOLIC BLOOD PRESSURE: 75 MMHG

## 2018-01-11 VITALS
HEART RATE: 114 BPM | OXYGEN SATURATION: 95 % | DIASTOLIC BLOOD PRESSURE: 71 MMHG | SYSTOLIC BLOOD PRESSURE: 122 MMHG | TEMPERATURE: 98.24 F

## 2018-01-11 VITALS
HEART RATE: 108 BPM | SYSTOLIC BLOOD PRESSURE: 144 MMHG | OXYGEN SATURATION: 95 % | TEMPERATURE: 97.7 F | DIASTOLIC BLOOD PRESSURE: 79 MMHG

## 2018-01-11 VITALS — OXYGEN SATURATION: 96 % | DIASTOLIC BLOOD PRESSURE: 74 MMHG | HEART RATE: 102 BPM | SYSTOLIC BLOOD PRESSURE: 128 MMHG

## 2018-01-11 VITALS
TEMPERATURE: 97.52 F | HEART RATE: 104 BPM | DIASTOLIC BLOOD PRESSURE: 84 MMHG | SYSTOLIC BLOOD PRESSURE: 150 MMHG | OXYGEN SATURATION: 93 %

## 2018-01-11 LAB
BUN SERPL-MCNC: 22 MG/DL (ref 7–18)
CALCIUM SERPL-MCNC: 9.5 MG/DL (ref 8.5–10.1)
CO2 SERPL-SCNC: 21 MMOL/L (ref 21–32)
CREAT SERPL-MCNC: 1.36 MG/DL (ref 0.6–1.4)
EOSINOPHIL NFR BLD AUTO: 101 K/UL (ref 130–400)
GLUCOSE SERPL-MCNC: 96 MG/DL (ref 70–99)
HCT VFR BLD CALC: 40.9 % (ref 42–52)
HGB BLD-MCNC: 13.6 G/DL (ref 14–18)
INR PPP: 1.2 (ref 0.9–1.1)
MCH RBC QN AUTO: 30.2 PG (ref 25–34)
MCHC RBC AUTO-ENTMCNC: 33.3 G/DL (ref 32–36)
MCV RBC AUTO: 90.7 FL (ref 80–100)
NRBC BLD AUTO-RTO: 2.2 %
NUCLEATED RED BLOOD CELL ABS: 0.49 K/UL (ref 0–0)
PMV BLD AUTO: 10.2 FL (ref 7.4–10.4)
POTASSIUM SERPL-SCNC: 4.5 MMOL/L (ref 3.5–5.1)
RED CELL DISTRIBUTION WIDTH CV: 15.2 % (ref 11.5–14.5)
RED CELL DISTRIBUTION WIDTH SD: 50.4 FL (ref 36.4–46.3)
SODIUM SERPL-SCNC: 139 MMOL/L (ref 136–145)
WBC # BLD AUTO: 22.72 K/UL (ref 4.8–10.8)

## 2018-01-11 RX ADMIN — SUCRALFATE SCH GM: 1 SUSPENSION ORAL at 20:35

## 2018-01-11 RX ADMIN — METOPROLOL SUCCINATE SCH MG: 25 TABLET, EXTENDED RELEASE ORAL at 08:13

## 2018-01-11 RX ADMIN — PANTOPRAZOLE SCH MG: 40 TABLET, DELAYED RELEASE ORAL at 20:35

## 2018-01-11 RX ADMIN — AMIODARONE HYDROCHLORIDE SCH MG: 200 TABLET ORAL at 20:39

## 2018-01-11 RX ADMIN — GABAPENTIN SCH MG: 300 CAPSULE ORAL at 20:38

## 2018-01-11 RX ADMIN — SUCRALFATE SCH GM: 1 SUSPENSION ORAL at 08:14

## 2018-01-11 RX ADMIN — MORPHINE SULFATE PRN MG: 2 INJECTION, SOLUTION INTRAMUSCULAR; INTRAVENOUS at 19:02

## 2018-01-11 RX ADMIN — SUCRALFATE SCH GM: 1 SUSPENSION ORAL at 15:52

## 2018-01-11 RX ADMIN — PANTOPRAZOLE SCH MG: 40 TABLET, DELAYED RELEASE ORAL at 08:14

## 2018-01-11 RX ADMIN — SUCRALFATE SCH GM: 1 SUSPENSION ORAL at 12:15

## 2018-01-11 RX ADMIN — MORPHINE SULFATE PRN MG: 2 INJECTION, SOLUTION INTRAMUSCULAR; INTRAVENOUS at 02:42

## 2018-01-11 NOTE — CARDIOLOGY PROGRESS NOTE
DATE: 01/11/2018

 

SUBJECTIVE:  Mr. Bradford is resting comfortably in bed without complaints of

chest pain or dyspnea.

 

OBJECTIVE:

VITAL SIGNS:  Blood pressure 144/79 with an irregular pulse of 100. 

Respiratory rate is 20.  The patient is afebrile at 36.5 degrees Celsius. 

Saturations 95% on room air.

NECK:  Supple with full carotid upstrokes.  No obvious bruits.  Jugular

venous pressure is flat at 90 degrees.  There is no thyromegaly.

CARDIOVASCULAR:  Reveals an irregularly irregular rhythm with distant heart

sounds.  No obvious murmurs.

LUNGS:  Clear without rales, rhonchi, or wheeze.

ABDOMEN:  Soft without bruits.

EXTREMITIES:  Reveal intact radial artery pulses bilaterally.  There is no

peripheral edema.

 

LABORATORY DATA:  CBC notes hemoglobin of 13.6, hematocrit 40.9, white count

22.7, platelet count 101,000.  Electrolytes note a sodium of 139, potassium

4.5, chloride 103, bicarbonate 21, BUN 22, creatinine 1.3, glucose 96.  INR

is 1.2.

 

IMPRESSION AND PLAN:

1.  Peptic ulcer disease -- per GI.

2.  Persistent atrial fibrillation -- tolerating amiodarone and low dose

metoprolol tartrate.

3.  Chronic systolic congestive heart failure -- compensated at this time.

4.  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

5.  Mild to moderate aortic insufficiency.

6.  History of right popliteal thrombosis -- August 2015.

7.  History of mantle cell lymphoma.

8.  Prostate carcinoma.

## 2018-01-11 NOTE — MEDICAL STUDENT: [facility]
Med Student Progress Note


Date of Service


Jan 11, 2018.





Subjective


Patient was accompanied by daughter in room.  I asked how he was doing and the 

events over the last 18 hours.  He wasn't able to give me the details of what 

he ate for dinner.  He wasn't sure how he slept or if there were any issues 

going to bed, however, he thought it was better than yesterday.  His daughter 

is in the room and has also noticed his confusion, and lack of goal-oriented 

thinking.  He is quite lethargic and sedated appearing during my visit.   The 

patient is not sure if the oncologist had visited today, he just doesn't 

remember.  He mentions that he would prefer to continue liquids for dinner 

tonight rather than switch to a full diet.  When asked about pain, he notes a 

concern for back pain, which is better after taking medication for it recently.

  He did not admit to abdominal pain.  He denies other symptoms like dyspnea or 

continued bleeding or changes to stool or bladder function.





Objective


Vital Signs











  Date Time  Temp Pulse Resp B/P (MAP) Pulse Ox O2 Delivery O2 Flow Rate FiO2


 


1/11/18 11:10 36.7 106 16 132/75 (94) 96 Room Air  


 


1/11/18 08:00      Room Air  


 


1/11/18 07:50 36.5 108 20 144/79 (100) 95 Room Air  


 


1/11/18 00:30      Room Air  


 


1/10/18 23:33 37.0 103 20 134/83 (100) 95 Room Air  


 


1/10/18 19:19 36.9 105 20 125/72 (89) 92 Room Air  


 


1/10/18 17:45 36.0 105 24 129/72 (91) 92 Room Air  


 


1/10/18 17:30     95 Room Air  


 


1/10/18 17:17 36.6 106 31  95   


 


1/10/18 16:00      Room Air  


 


1/10/18 15:18 36.6 106 31 130/69 (89) 95 Room Air  











Physical Exam


General Appearance:  WD/WN, no apparent distress


ENT:  hearing grossly normal


Neck:  no JVD


Respiratory/Chest:  chest non-tender, no respiratory distress, no accessory 

muscle use


Cardiovascular:  no edema, no JVD, + irregularly irregular





Laboratory Results





Last 24 Hours








Test


  1/11/18


05:32


 


White Blood Count 22.72 K/uL 


 


Red Blood Count 4.51 M/uL 


 


Hemoglobin 13.6 g/dL 


 


Hematocrit 40.9 % 


 


Mean Corpuscular Volume 90.7 fL 


 


Mean Corpuscular Hemoglobin 30.2 pg 


 


Mean Corpuscular Hemoglobin


Concent 33.3 g/dl 


 


 


Platelet Count 101 K/uL 


 


Mean Platelet Volume 10.2 fL 


 


RDW Standard Deviation 50.4 fL 


 


RDW Coefficient of Variation 15.2 % 


 


Nucleated RBC Absolute Count


(auto) 0.49 K/uL 


 


 


Neutrophils % (Manual) 70.1 % 


 


Lymphocytes % (Manual) 20.2 % 


 


Monocytes % (Manual) 4.4 % 


 


Metamyelocytes % 0.9 % 


 


Myelocytes % 3.5 % 


 


Blast Cells % 0.9 % 


 


Nucleated Red Blood Cells % 2.2 % 


 


Neutrophils # (Manual) 15.93 K/uL 


 


Total Absolute Neutrophils 15.93 K/uL 


 


Lymphocytes # (Manual) 4.59 K/uL 


 


Total Absolute Lymphocytes 4.59 K/uL 


 


Monocytes # (Manual) 1.00 K/uL 


 


Metamyelocytes # 0.20 K/uL 


 


Myelocytes # 0.80 K/uL 


 


Blast Cells # 0.20 K/uL 


 


Prothrombin Time 12.9 SECONDS 


 


Prothromb Time International


Ratio 1.2 


 


 


Sodium Level 139 mmol/L 


 


Potassium Level 4.5 mmol/L 


 


Chloride Level 103 mmol/L 


 


Carbon Dioxide Level 21 mmol/L 


 


Anion Gap 15.0 mmol/L 


 


Blood Urea Nitrogen 22 mg/dl 


 


Creatinine 1.36 mg/dl 


 


Est Creatinine Clear Calc


Drug Dose 36.8 ml/min 


 


 


Estimated GFR (


American) 53.1 


 


 


Estimated GFR (Non-


American 45.8 


 


 


BUN/Creatinine Ratio 16.2 


 


Random Glucose 96 mg/dl 


 


Calcium Level 9.5 mg/dl 











Medications





Current Inpatient Medications








 Medications


  (Trade)  Dose


 Ordered  Sig/Maria Ines


 Route  Start Time


 Stop Time Status Last Admin


Dose Admin


 


 Nitroglycerin


  (Nitrostat Tab)  0.4 mg  UD  PRN


 SL  1/8/18 20:30


 2/7/18 20:29   


 


 


 Ondansetron HCl


  (Zofran Odt)  8 mg  Q6H  PRN


 PO  1/8/18 20:45


 2/7/18 20:44  1/8/18 22:36


8 MG


 


 Morphine Sulfate


  (MoRPHine


 SULFATE INJ)  2 mg  Q2H  PRN


 IV  1/8/18 20:45


 1/22/18 20:44  1/11/18 02:42


2 MG


 


 Pantoprazole


 Sodium


  (Protonix Tab)  40 mg  BID


 PO  1/9/18 21:00


 2/8/18 20:59  1/11/18 08:14


40 MG


 


 Sucralfate


  (Carafate Susp)  1 gm  QID


 PO  1/9/18 17:00


 2/8/18 16:59  1/11/18 12:15


1 GM


 


 Metoprolol


 Succinate


  (Toprol Xl Tab)  25 mg  QAM


 PO  1/11/18 08:00


 2/10/18 08:59  1/11/18 08:13


25 MG











Assessment and Plan


Assessment and Plan:


A/P:


Elevated INR


-recived Vit. K injection for warfarin reversal 


-Discontinue warfarin for a short period of time to give ulcers time to heal 

before -k-d-t-h-e-e-u-i-n-g- -i-n- -a- -f-e-w- -w-e-e-k-s--/--m-o-n-t-h-s-.-  

reinitiating in approx. a week's time.





Stomach ulcers


-EGD performed 2 days ago showed 6 gastric ulcers


-Continue PPI


-Follow up with Hbg/Hct before discharge and again 1 week after discharge from 

hospital


-Was taking an 81 mg Aspirin, this is to be discontinued for the time being


-Can start full liquid diet today, -a-n-d- -i-f- -t-o-l-q-u-b-t-e-d--,- 

-f-u-l-l- -d-i-e-t- -d-y-v-o-r-r-o-w- wants to continue liquid diet for the 

time being


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 

lymphoma.  Heme/Onc consulted and will wait for their input.





Elevated Troponin I and CK-MB


-Monitor for worsening symptoms


-Repeat labs until peak determined - Troponin did peak at 0.112 and declined.  

this mild elevation is considered consistent with demand ischemia

## 2018-01-11 NOTE — PROGRESS NOTE
Subjective


Date of Service:


Jan 11, 2018.


Subjective


Pt evaluation today including:  conversation w/ patient, physical exam, chart 

review, lab review, conversation w/ consultant


feeling ok  -- about the same.  notes abdominal pain about the same not worse.  

did well w full liquids and thinks he can do OK w regular food


no cp no sob


d/w dr browne - is planning on having family meeting to discuss situation w pt 

and family after having gotten to review pathology further





Problem List


Medical Problems:


(1) Elevated INR


Status: Acute  





(2) Epigastric abdominal pain


Status: Acute  





(3) GI bleed


Status: Acute  





(4) Precordial chest pain


Status: Acute  





(5) Substernal chest pain


Status: Acute  





(6) Substernal precordial chest pain


Status: Acute  





(7) Superficial abrasion


Status: Acute  





(8) Supratherapeutic INR


Status: Acute  











Review of Systems


all other ROS otherwise negative except for as above





Objective


Vital Signs











  Date Time  Temp Pulse Resp B/P (MAP) Pulse Ox O2 Delivery O2 Flow Rate FiO2


 


1/11/18 15:10 36.4 104 20 150/84 (106) 93 Room Air  


 


1/11/18 11:10 36.7 106 16 132/75 (94) 96 Room Air  


 


1/11/18 08:00      Room Air  


 


1/11/18 07:50 36.5 108 20 144/79 (100) 95 Room Air  


 


1/11/18 00:30      Room Air  


 


1/10/18 23:33 37.0 103 20 134/83 (100) 95 Room Air  


 


1/10/18 19:19 36.9 105 20 125/72 (89) 92 Room Air  


 


1/10/18 17:45 36.0 105 24 129/72 (91) 92 Room Air  


 


1/10/18 17:30     95 Room Air  


 


1/10/18 17:17 36.6 106 31  95   


 


1/10/18 16:00      Room Air  











Physical Exam


General Appearance:  no apparent distress


Eyes:  EOMI


ENT:  hearing grossly normal


Neck:  trachea midline


Respiratory/Chest:  lungs clear, normal breath sounds, no respiratory distress, 

no accessory muscle use


Cardiovascular:  + irregularly irregular (rate ~100)


Abdomen:  soft, + tenderness (mild epigastric tednerness no guarding/rebound/

rigidity)


Neurologic/Psychiatric:  CNs II-XII nml as tested, alert


Skin:  normal color, warm/dry





Laboratory Results





Last 24 Hours








Test


  1/11/18


05:32


 


White Blood Count 22.72 K/uL 


 


Red Blood Count 4.51 M/uL 


 


Hemoglobin 13.6 g/dL 


 


Hematocrit 40.9 % 


 


Mean Corpuscular Volume 90.7 fL 


 


Mean Corpuscular Hemoglobin 30.2 pg 


 


Mean Corpuscular Hemoglobin


Concent 33.3 g/dl 


 


 


Platelet Count 101 K/uL 


 


Mean Platelet Volume 10.2 fL 


 


RDW Standard Deviation 50.4 fL 


 


RDW Coefficient of Variation 15.2 % 


 


Nucleated RBC Absolute Count


(auto) 0.49 K/uL 


 


 


Neutrophils % (Manual) 70.1 % 


 


Lymphocytes % (Manual) 20.2 % 


 


Monocytes % (Manual) 4.4 % 


 


Metamyelocytes % 0.9 % 


 


Myelocytes % 3.5 % 


 


Blast Cells % 0.9 % 


 


Nucleated Red Blood Cells % 2.2 % 


 


Neutrophils # (Manual) 15.93 K/uL 


 


Total Absolute Neutrophils 15.93 K/uL 


 


Lymphocytes # (Manual) 4.59 K/uL 


 


Total Absolute Lymphocytes 4.59 K/uL 


 


Monocytes # (Manual) 1.00 K/uL 


 


Metamyelocytes # 0.20 K/uL 


 


Myelocytes # 0.80 K/uL 


 


Blast Cells # 0.20 K/uL 


 


Prothrombin Time 12.9 SECONDS 


 


Prothromb Time International


Ratio 1.2 


 


 


Sodium Level 139 mmol/L 


 


Potassium Level 4.5 mmol/L 


 


Chloride Level 103 mmol/L 


 


Carbon Dioxide Level 21 mmol/L 


 


Anion Gap 15.0 mmol/L 


 


Blood Urea Nitrogen 22 mg/dl 


 


Creatinine 1.36 mg/dl 


 


Est Creatinine Clear Calc


Drug Dose 36.8 ml/min 


 


 


Estimated GFR (


American) 53.1 


 


 


Estimated GFR (Non-


American 45.8 


 


 


BUN/Creatinine Ratio 16.2 


 


Random Glucose 96 mg/dl 


 


Calcium Level 9.5 mg/dl 











Assessment and Plan


upper GI bleeding


-due to stomach ulcers and coumadin coagulopathy -- underlying lymphoma also 

almost certainly playing a role, although appears that aspirin and coumadin 

still were significant factors


-bleeding appears to have resolved.  fortunately no significant blood loss





PUD


-stomach ulcers likely asa induced but superimposed on lymphoma


-pt notes no hx of CAD/cerebrovascular disease - hold indefinitely


-continue protonix


-appears stabilizing





coumadin coagulopathy


-almost certainly the final reason for the bleeding


-reversed





lymphoma


-appears to have occurence on stomach, prior adenopathy from previous CT ?

related


-oncology following, is to discuss with family in depth later today


-peripheral blood also showing findings concerning





afib, prior DVT


-w ulcers and bleeding above - continue off coumadin for now, but long term 

risk will favor resuming -- follow Hgb and clinical status closely as outpt and 

work to resume coumadin as soon as is safe -although this will be "work in 

progress" given not only the stomach ulcers but the lymphoma


-rate control upper end of acceptable, pharmacy noted that amiodarone was 

actually still on hold - will resume, anticipate better rate control with this 

and appreciate pharmacy oversight





peripheral neuropathy


-gabapentin





metastatic prostate cancer


-outpt management





DVT proph 


-ambulation as best as possible - pharmacologic obviously contraindicated due 

to PUD/bleeding

## 2018-01-12 VITALS
DIASTOLIC BLOOD PRESSURE: 81 MMHG | TEMPERATURE: 97.52 F | OXYGEN SATURATION: 92 % | HEART RATE: 101 BPM | SYSTOLIC BLOOD PRESSURE: 144 MMHG

## 2018-01-12 VITALS
TEMPERATURE: 98.24 F | HEART RATE: 100 BPM | SYSTOLIC BLOOD PRESSURE: 133 MMHG | OXYGEN SATURATION: 94 % | DIASTOLIC BLOOD PRESSURE: 66 MMHG

## 2018-01-12 VITALS
OXYGEN SATURATION: 94 % | SYSTOLIC BLOOD PRESSURE: 133 MMHG | DIASTOLIC BLOOD PRESSURE: 66 MMHG | HEART RATE: 100 BPM | TEMPERATURE: 98.24 F

## 2018-01-12 RX ADMIN — GABAPENTIN SCH MG: 300 CAPSULE ORAL at 08:10

## 2018-01-12 RX ADMIN — PANTOPRAZOLE SCH MG: 40 TABLET, DELAYED RELEASE ORAL at 08:10

## 2018-01-12 RX ADMIN — AMIODARONE HYDROCHLORIDE SCH MG: 200 TABLET ORAL at 08:10

## 2018-01-12 RX ADMIN — METOPROLOL SUCCINATE SCH MG: 25 TABLET, EXTENDED RELEASE ORAL at 08:10

## 2018-01-12 RX ADMIN — SUCRALFATE SCH GM: 1 SUSPENSION ORAL at 08:11

## 2018-01-12 RX ADMIN — SUCRALFATE SCH GM: 1 SUSPENSION ORAL at 12:11

## 2018-01-12 NOTE — DISCHARGE INSTRUCTIONS
Discharge Instructions


Date of Service


Jan 12, 2018.





Admission


Reason for Admission:  Acute Blood Loss Anemia, Upper Gi Bleed





Discharge


Discharge Diagnosis / Problem:  stomach ulcers related to aspirin and gastric 

lymphoma





Discharge Goals


Goal(s):  Diagnostic testing, Therapeutic intervention





Activity Recommendations


Activity Level:  Assistance Required





.





Additional Information


Patient informed of condition:  Yes


Advance Directives:  Yes


DNR:  No (although i anticipate he will likely choose to change his status in 

the near future)


Level of Care:  Skilled


Communicable Disease:  No


Prognosis:  Deteriorating (due to malignancy)





Instructions / Follow-Up


Instructions / Follow-Up


GI bleeding


-presented with GI bleeding from gastric ulcers - fortunately was 

hemodynamically stable throughout the entirety of his stay


   -EGD showing multiple ulcers no longer bleeding


   -pathology then showed high grade lymphoma      


      -situation most consistent with mixed cause of ulceration - asa + 

lymphoma - hence asa on indefinite hold


      -coumadin held as well - initially with thought of resuming once it was 

clear that GI bleeding was stable and ulcerations had begun to heal.  obviously 

this is now


      a problem for ongoing consideration balancing bleed risk with ulcers vs 

DVT/stroke risk w afib and lymphoma.  currently needs to be held, would 

consider resuming


      if he's not showing an active decline in relationship to the cancer in 

the coming weeks, otherwise hold indefinitely





Current Hospital Diet


Patient's current hospital diet: Regular Diet





Discharge Diet


Recommended Diet:  Regular Diet





Procedures


Procedures Performed:  


EGD with biopsy





Pending Studies


Studies pending at discharge:  no





Laboratory Results





Hemoglobin A1c








Test


  12/27/17


19:14 Range/Units


 


 


Estimated Average Glucose 117   mg/dl


 


Hemoglobin A1c 5.7 H 4.5-5.6  %








Lipid Panel








Test


  12/27/17


19:14 Range/Units


 


 


Triglycerides Level 85  0-150  mg/dl


 


Cholesterol Level 164  0-200  mg/dl


 


HDL Cholesterol 39   mg/dl


 


Cholesterol/HDL Ratio 4.2   


 


LDL Cholesterol, Calculated 108   mg/dl











Medical Emergencies








.


Who to Call and When:





Medical Emergencies:  If at any time you feel your situation is an emergency, 

please call 911 immediately.





.





Non-Emergent Contact


Non-Emergency issues call your:  Primary Care Provider





.


.








"Provider Documentation" section prepared by Erlin Swift.








.





Core Measure Problem


Core Measures:  None

## 2018-01-12 NOTE — HEMATOLOGY/ONCOLOGY PROG NOTE
Hematology/Onc Progress Note


Date of Service


Jan 11, 2018.





Diagnoses


Recurrent, aggressive NHL


Metastatic prostate cancer


GI bleed





Medications





Medications Administered








 Medications


  (Trade)  Dose


 Ordered  Sig/Maria Ines


 Route  Start Time


 Stop Time Status Last Admin


Dose Admin


 


 Phytonadione 10


 mg/Sodium Chloride  51 ml @ 


 102 mls/hr  ONE  ONCE


 IV  1/8/18 19:15


 1/8/18 19:44 DC 1/8/18 19:47


102 MLS/HR


 


 Pantoprazole


 Sodium 80 mg/


 Dextrose  120 ml @ 


 400 mls/hr  NOW


 IV  1/8/18 19:15


 1/8/18 21:54 DC 1/8/18 20:22


400 MLS/HR


 


 Potassium


 Chloride/Sodium


 Chloride  1,000 ml @ 


 50 mls/hr  Q20H


 IV  1/8/18 22:30


 1/10/18 15:20 DC 1/9/18 18:09


50 MLS/HR


 


 Ondansetron HCl


  (Zofran Odt)  8 mg  Q6H  PRN


 PO  1/8/18 20:45


 2/7/18 20:44  1/8/18 22:36


8 MG


 


 Morphine Sulfate


  (MoRPHine


 SULFATE INJ)  2 mg  Q2H  PRN


 IV  1/8/18 20:45


 1/22/18 20:44  1/11/18 19:02


2 MG


 


 Pantoprazole


 Sodium 40 mg/


 Dextrose  100 ml @ 


 20 mls/hr  Q5H


 IV  1/8/18 21:00


 1/9/18 13:53 DC 1/9/18 07:08


20 MLS/HR


 


 Pantoprazole


 Sodium


  (Protonix Tab)  40 mg  BID


 PO  1/9/18 21:00


 2/8/18 20:59  1/12/18 08:10


40 MG


 


 Sucralfate


  (Carafate Susp)  1 gm  QID


 PO  1/9/18 17:00


 2/8/18 16:59  1/12/18 08:11


1 GM


 


 Metoprolol


 Succinate


  (Toprol Xl Tab)  25 mg  QAM


 PO  1/11/18 08:00


 2/10/18 08:59  1/12/18 08:10


25 MG


 


 Metoprolol


 Succinate


  (Toprol Xl Tab)  25 mg  1148  ONCE


 PO  1/10/18 11:48


 1/10/18 11:55 DC 1/10/18 12:46


25 MG


 


 Amiodarone HCl


  (Cordarone Tab)  200 mg  BID


 PO  1/11/18 20:00


 2/10/18 19:59  1/12/18 08:10


200 MG


 


 Gabapentin


  (Neurontin Cap)  300 mg  BID


 PO  1/11/18 20:00


 2/10/18 19:59  1/12/18 08:10


300 MG











Subjective


Mr. Bradford was seated comfortably in bed with his two children nearby. He 

denied any pain but did describe feeling tired and generally weak.


Review of Systems:  


   Constitutional:  No fever


   ENT:  No unusual epistaxis


   Respiratory:  No cough, No shortness of breath


   Cardiovascular:  No chest pain


   Abdomen:  No pain, No nausea, No vomiting, No GI bleeding


   Musculoskeletal:  No joint pain, No muscle pain


   Male :  No hematuria


   Heme:  No swollen lymph nodes, No night sweats





Vital Signs





Vital Signs Past 12 Hours








  Date Time  Temp Pulse Resp B/P (MAP) Pulse Ox O2 Delivery O2 Flow Rate FiO2


 


1/12/18 07:48 36.4 101 20 144/81 (102) 92 Room Air  


 


1/12/18 00:00      Room Air  











Physical Exam


Constitutional:  


   General Apperance:  too thin


   Level of Distress:  NAD, chronically ill


Psychiatric:  


   Mental Status:  active & alert


   Orientation:  oriented except where noted


Lungs:  


   Auscuitation:  CTA except as noted


Cardiovascular:  


   Heart Auscultation:  RRR


Abdomen:  


   Inspection & Palpation:  soft, no tenderness, guarding & rebound


Extremities:  no edema





Laboratory


1/9/18 14:35








1/10/18 06:15








Red Blood Count 4.17, Mean Corpuscular Volume 90.2, Mean Corpuscular Hemoglobin 

30.0, Mean Corpuscular Hemoglobin Concent 33.2, Mean Platelet Volume 10.7





1/11/18 05:32








Red Blood Count 4.51, Mean Corpuscular Volume 90.7, Mean Corpuscular Hemoglobin 

30.2, Mean Corpuscular Hemoglobin Concent 33.3, Mean Platelet Volume 10.2





1/10/18 06:15








1/11/18 05:32

















Test


  1/9/18


14:35 1/9/18


20:24 1/10/18


06:15 1/11/18


05:32


 


Total Creatine Kinase


  56 U/L


() 


  


  


 


 


Creatine Kinase MB


  4.1 ng/ml


(0.5-3.6) 


  


  


 


 


Creatine Kinase MB Ratio 7.3 (0-3.0)    


 


Troponin I


  0.112 ng/ml


(0-0.045) 0.105 ng/ml


(0-0.045) 


  


 


 


White Blood Count


  


  


  14.82 K/uL


(4.8-10.8) 22.72 K/uL


(4.8-10.8)


 


Red Blood Count


  


  


  4.17 M/uL


(4.7-6.1) 4.51 M/uL


(4.7-6.1)


 


Hemoglobin


  


  


  12.5 g/dL


(14.0-18.0) 13.6 g/dL


(14.0-18.0)


 


Hematocrit   37.6 % (42-52)  40.9 % (42-52) 


 


Mean Corpuscular Volume


  


  


  90.2 fL


() 90.7 fL


()


 


Mean Corpuscular Hemoglobin


  


  


  30.0 pg


(25-34) 30.2 pg


(25-34)


 


Mean Corpuscular Hemoglobin


Concent 


  


  33.2 g/dl


(32-36) 33.3 g/dl


(32-36)


 


Platelet Count


  


  


  102 K/uL


(130-400) 101 K/uL


(130-400)


 


Mean Platelet Volume


  


  


  10.7 fL


(7.4-10.4) 10.2 fL


(7.4-10.4)


 


RDW Standard Deviation


  


  


  48.1 fL


(36.4-46.3) 50.4 fL


(36.4-46.3)


 


RDW Coefficient of Variation


  


  


  14.8 %


(11.5-14.5) 15.2 %


(11.5-14.5)


 


Nucleated RBC Absolute Count


(auto) 


  


  0.27 K/uL


(0-0) 0.49 K/uL


(0-0)


 


Neutrophils % (Manual)   60.3 %  70.1 % 


 


Lymphocytes % (Manual)   8.6 %  20.2 % 


 


Variant Lymphocytes % (manual)   13.8 %  


 


Monocytes % (Manual)   3.4 %  4.4 % 


 


Eosinophils % (Manual)   0.9 %  


 


Basophils % (Manual)   1.7 % (0-2)  


 


Metamyelocytes %   7.8 %  0.9 % 


 


Myelocytes %   2.6 %  3.5 % 


 


Blast Cells %   0.9 %  0.9 % 


 


Nucleated Red Blood Cells %   1.8 %  2.2 % 


 


Neutrophils # (Manual)


  


  


  8.94 K/uL


(1.4-6.5) 15.93 K/uL


(1.4-6.5)


 


Total Absolute Neutrophils


  


  


  8.94 K/uL


(1.4-6.5) 15.93 K/uL


(1.4-6.5)


 


Lymphocytes # (Manual)


  


  


  1.27 K/uL


(1.2-3.4) 4.59 K/uL


(1.2-3.4)


 


Absolute Variant Lymphocytes   2.05 K/uL  


 


Total Absolute Lymphocytes


  


  


  3.32 K/uL


(1.2-3.4) 4.59 K/uL


(1.2-3.4)


 


Monocytes # (Manual)


  


  


  0.50 K/uL


(0.11-0.59) 1.00 K/uL


(0.11-0.59)


 


Eosinophils # (Manual)


  


  


  0.13 K/uL


(0-0.5) 


 


 


Basophils # (Manual)


  


  


  0.25 K/uL


(0-0.2) 


 


 


Metamyelocytes #


  


  


  1.16 K/uL


(0-0) 0.20 K/uL


(0-0)


 


Myelocytes #


  


  


  0.39 K/uL


(0-0) 0.80 K/uL


(0-0)


 


Blast Cells #


  


  


  0.13 K/uL


(0-0) 0.20 K/uL


(0-0)


 


Red Blood Cell Morphology   Unremarkable  


 


Prothrombin Time


  


  


  11.9 SECONDS


(9.0-12.0) 12.9 SECONDS


(9.0-12.0)


 


Prothromb Time International


Ratio 


  


  1.1 (0.9-1.1) 


  1.2 (0.9-1.1) 


 


 


Anion Gap


  


  


  15.0 mmol/L


(3-11) 15.0 mmol/L


(3-11)


 


Est Creatinine Clear Calc


Drug Dose 


  


  41.8 ml/min 


  36.8 ml/min 


 


 


Estimated GFR (


American) 


  


  61.8 


  53.1 


 


 


Estimated GFR (Non-


American 


  


  53.3 


  45.8 


 


 


BUN/Creatinine Ratio   18.2 (10-20)  16.2 (10-20) 


 


Calcium Level


  


  


  9.1 mg/dl


(8.5-10.1) 9.5 mg/dl


(8.5-10.1)











Assessment & Plan


I had a long conversation with Mr. Bradford and his children regarding goals of 

care. The biopsies from an ulcer in his stomach are consistent with a very 

aggressive non-Hodgkin lymphoma. The exact identity of the lymphoma isn't clear

, but to a certain extent that is less important. The lymphoma is highly 

proliferative and will require highly toxic, multi-agent chemotherapy to treat. 

Given his age and overall condition, I do not think he would tolerate such 

therapy. Furthermore, he has what appears to be multiple foci of bowel wall 

involvement, which puts him at risk of bowel rupture with treatment. Overall, I 

think the next best step for him is hospice care. We discussed the concept of 

hospice and the services they provide. He lives at the [facility] and they could 

easily transition him into a hospice setting. We discussed that important goals 

of hospice include no longer pursuing therapies intended primarily to extend 

life, such as chemotherapy, and remaining at home and out of the hospital. He 

expressed understanding of the implications of this transition and agreed to 

hospice care. His family was in agreement as well. I asked Dr. Swift to 

consult palliative care to get the process started. I would be happy to be his 

physician of record, though I suspect the [facility] may prefer one of their 

internal physicians instead. I reassured Mr. Bradford that I will always be a 

part of his care and would be happy to speak with him at any time, though my 

role in his care will now be somewhat diminished. He seemed satisfied with our 

conversation and at peace with his decision.

## 2018-01-12 NOTE — DISCHARGE SUMMARY
Discharge Summary


Date of Service


Jan 12, 2018.





Discharge Summary


Admission Date:


Jan 8, 2018 at 20:27


Discharge Date:  Jan 12, 2018


Discharge Disposition:  Skilled nursing facility


Principal Diagnosis:  UGI bleeding due to gastric ulcers complicated by lymphoma


Immunizations:  


   Have You Had Influenza Vaccine:  Unknown


   History of Tetanus Vaccine?:  utd


   History of Pneumococcal:  Unknown


   History of Hepatitis B Vaccine:  No


Procedures:


DICTATED BY:  Jasbir Ayala D.O.


Procedure Date: 1/9/2018 1:28 PM


Procedure:            Upper GI endoscopy


Indications:          Melena


Medicines:            Monitored Anesthesia Care


Complications:        No immediate complications.


Estimated Blood Loss: Estimated blood loss: none.


Procedure:            Pre-Anesthesia Assessment:


                      - Prior to the procedure, a History and Physical was 


                      performed, and patient medications and allergies were 


                      reviewed. The patient's tolerance of previous 


                      anesthesia was also reviewed. The risks and benefits of 


                      the procedure and the sedation options and risks were 


                      discussed with the patient. All questions were 


                      answered, and informed consent was obtained. Prior 


                      Anticoagulants: The patient last took aspirin 1 day and 


                      Coumadin (warfarin) 1 day prior to the procedure. ASA 


                      Grade Assessment: IV - A patient with severe systemic 


                      disease that is a constant threat to life. After 


                      reviewing the risks and benefits, the patient was 


                      deemed in satisfactory condition to undergo the 


                      procedure.


                      After obtaining informed consent, the endoscope was 


                      passed under direct vision. Throughout the procedure, 


                      the patient's blood pressure, pulse, and oxygen 


                      saturations were monitored continuously. The Scope was 


                      introduced through the mouth, and advanced to the 


                      second part of duodenum. The upper GI endoscopy was 


                      accomplished without difficulty. The patient tolerated 


                      the procedure well.


Findings:


     The esophagus was normal.


     Six non-bleeding cratered gastric ulcers with no stigmata of bleeding 


     were found in the gastric fundus. The largest lesion was 15 mm in 


     largest dimension. Biopsies were taken with a cold forceps for histology.


     The examined duodenum was normal.


Impression:           - Normal esophagus.


                      - Non-bleeding gastric ulcers with no stigmata of 


                      bleeding. Biopsied.


                      - Normal examined duodenum.


Recommendation:       - Return patient to hospital [facility] for ongoing care.


                      - Use Protonix (pantoprazole) 40 mg PO BID.


                      - Use sucralfate suspension 1 gram PO QID for 10 days.


                      - Repeat upper endoscopy in 2 months to evaluate the 


                      response to therapy.


                      - Await pathology results.


Jasbir Ayala DO


1/9/2018 1:49:20 PM


This report has been signed electronically.


Note Initiated On: 1/9/2018 1:28 PM


     I attest to the content of the Intraoperative Record and orders 


     documented therein, exceptions below


---------------------------------------





pathology from EGD:


FINAL DIAGNOSIS





  


GASTRIC ULCER, BIOPSIES: 


1. HIGH-GRADE B-CELL NON-HODGKINS LYMPHOMA. SEE COMMENT. 


2. H. PYLORI ORGANISMS PRESENT ON IMMUNOPEROXIDASE STAIN.  


------------------------------------------------





Last Resulted CBC


1/11/18 05:32








Red Blood Count 4.51, Mean Corpuscular Volume 90.7, Mean Corpuscular Hemoglobin 

30.2, Mean Corpuscular Hemoglobin Concent 33.3, Mean Platelet Volume 10.2





Last Resulted BMP


1/11/18 05:32








Consultations:


GI


heme/onc


palliative





Medication Reconciliation


New Medications:  


Pantoprazole (Pantoprazole Sodium) 40 Mg Tab


40 MG PO BID, #60 TAB





Sucralfate (Sucralfate) 1 Gm/10 Ml Susp


1 GM PO QID for 30 Days





 


Continued Medications:  


Amiodarone Hcl (Cordarone) 200 Mg Tab


200 MG PO BID, TAB





Bicalutamide (Casodex) 50 Mg Tab


50 MG PO QAM, TAB





Calcium Carbonate-Vitamin D (Oyster Shell Calcium/D3 500-400 mg-Unit) 1 Tab Tab


2 TABS PO DAILY





Furosemide (Furosemide) 20 Mg Tab


20-40 MG PO DAILY PRN for "DEPENDS ON WT GAIN"., #30





Gabapentin (Neurontin) 300 Mg Cap


300 MG PO BID, CAP





Leuprolide Acetate (Lupron Depot) 30 Mg Kit


30 MG IM Q4MO





Metoprolol Tartrate (Lopressor) 25 Mg Tab


12.5 MG PO BID, TAB





Multivitamin (Multivitamin)  Tab


1 TAB PO QAM, TAB





Omega-3 Fatty Acids (Fish Oil 1200 mg) 1 Cap Cap


1200 MG PO DAILY





 


Discontinued Medications:  


Aspirin (Aspirin Ec) 81 Mg Tab


81 MG PO QAM





Ranitidine (Zantac) 150 Mg Tab


150 MG PO BID, TAB





Warfarin Sodium (Coumadin) 5 Mg Tab


1 TAB PO 6XWK for 90 Days, TAB 1 Refill


EVERY DAY EXCEPT MONDAYS.


Warfarin Sodium (Warfarin Sodium) 5 Mg Tab


7.5 MG PO WK for 90 Days, TAB 3 Refills


TAKES ON MONDAYS ONLY








Discharge Exam


Physical Exam:  


   General Appearance:  no apparent distress


   Eyes:  EOMI


   ENT:  hearing grossly normal


   Neck:  trachea midline


   Respiratory/Chest:  no respiratory distress, no accessory muscle use


   Abdomen / GI:  non tender, soft


   Neurologic/Psychiatric:  CNs II-XII nml as tested, alert





Hospital Course


upper GI bleeding


-due to stomach ulcers and coumadin coagulopathy -- underlying lymphoma also 

almost certainly playing a role, although appears that aspirin and coumadin 

still were significant factors


-bleeding appears to have resolved.  fortunately no significant blood loss





PUD


-stomach ulcers likely asa induced but superimposed on lymphoma


-pt notes no hx of CAD/cerebrovascular disease - hold indefinitely


-continue protonix indefinitely given lymphoma as probable underlying factor 

making risk of future bleeding significant even once asa induced changes have 

healed





coumadin coagulopathy


-almost certainly the final reason for the bleeding


-reversed





lymphoma


-appears to have occurence on stomach, prior adenopathy from previous CT ?

related


-oncology had extensive discussions w pt and family - palliative care warranted


-peripheral blood also showing findings concerning underscoring above





afib, prior DVT


-w ulcers and bleeding above - continue off coumadin for now, but long term 

risk will favor resuming -- follow Hgb and clinical status closely as outpt and 

work to resume coumadin as soon as is safe -although this will be "work in 

progress" given not only the stomach ulcers but the lymphoma; and might be 

better dictated by his overall stability/decline





peripheral neuropathy


-gabapentin





metastatic prostate cancer


-outpt management


Total Time Spent:  Less than 30 minutes


This includes examination of the patient, discharge planning, medication 

reconciliation, and communication with other providers.





Discharge Instructions


Please refer to the electronic Patient Visit Report (Discharge Instructions) 

for additional information.





Additional Copies To


Gurmeet Redman M.D.

## 2018-01-12 NOTE — PALLIATIVE CARE CONSULTATION
Consultation


Date of Consultation:


Jan 12, 2018.


Requesting Physician:


Dr. Swift


Attending Physician:


Dr. Swift


Reason for Consultation:


Goals of care


History of Present Illness


This 89 year old male patient with PMH prostate cancer, Non-Hodgkin's lymphoma, 

afib, and others listed below, presented to the hospital four days ago with GI 

bleed. He came from his PCP for INR of 6.4 and black tarry stools. He was on 

Coumadin for Afib as well as ASA. EGD was performed, biopsy of stomach ulcer 

shows a very aggressive form of NHL. Dr. Donahue met with the patient and 

family yesterday and discussed this diagnosis as well as the fact that any 

treatment for this cancer would be aggressive and toxic which the patient would 

likely not tolerate well. After discussion, the patient and family expressed 

that they would like to pursue comfort measures and hospice. Palliative care is 

consulted.





I met with the patient first in room 410. He is awake, alert and oriented with 

mild forgetfulness. He was able to recap the conversation with Dr. Donahue 

yesterday and confirmed the fact that he wanted to be made comfortable and does 

not want any further aggressive medical treatment. Patient states he wants to 

go back to the [facility], skilled initially with therapy, with a transition to 

hospice. I discussed code status, patient DOES NOT want CPR or intubation. He 

wants comfort measures only, abx for comfort and no artificial hydration/

nutrition. I met later with patient's son Arnel and daughter Paloma, both of whom 

are POAs. POLST form was completed and signed by Arnel with patient's permission.





Past Medical/Surgical History


Medical History:


Prostate cancer


NHL


Afib


CHF


DVT


Heart disease


Lyme disease


Shingles


Surgical History:


Prostatectomy


Appendectomy





Social History


Smoking Status:  Never Smoker


History of Alcohol Use:  Yes (2 glasses wine/week)


Drug Use:  none


Marital Status:  


Housing Status:  lives with family


Occupation Status:  unemployed





Review of Systems


Constitutional:  + weakness, No fever, No chills


ENT:  No trouble swallowing


Respiratory:  No cough, No shortness of breath


Cardiac:  No chest pain, No edema


Abdomen:  No pain, No nausea, No vomiting


Musculoskeletal:  + problem reported (occasional back pain when moving, 

especially when trying to get out of bed)


Male :  No problem reported


Psychiatric:  No depression symptoms, No anxiety





Allergies


Coded Allergies:  


     Acetazolamide (Verified  Allergy, Unknown, Diamox Eye Drops ? rxn, 1/2/18)





Medications





Current Inpatient Medications








 Medications


  (Trade)  Dose


 Ordered  Sig/Maria Ines


 Route  Start Time


 Stop Time Status Last Admin


Dose Admin


 


 Nitroglycerin


  (Nitrostat Tab)  0.4 mg  UD  PRN


 SL  1/8/18 20:30


 2/7/18 20:29   


 


 


 Ondansetron HCl


  (Zofran Odt)  8 mg  Q6H  PRN


 PO  1/8/18 20:45


 2/7/18 20:44  1/8/18 22:36


8 MG


 


 Morphine Sulfate


  (MoRPHine


 SULFATE INJ)  2 mg  Q2H  PRN


 IV  1/8/18 20:45


 1/22/18 20:44  1/11/18 19:02


2 MG


 


 Pantoprazole


 Sodium


  (Protonix Tab)  40 mg  BID


 PO  1/9/18 21:00


 2/8/18 20:59  1/12/18 08:10


40 MG


 


 Sucralfate


  (Carafate Susp)  1 gm  QID


 PO  1/9/18 17:00


 2/8/18 16:59  1/12/18 12:11


1 GM


 


 Metoprolol


 Succinate


  (Toprol Xl Tab)  25 mg  QAM


 PO  1/11/18 08:00


 2/10/18 08:59  1/12/18 08:10


25 MG


 


 Amiodarone HCl


  (Cordarone Tab)  200 mg  BID


 PO  1/11/18 20:00


 2/10/18 19:59  1/12/18 08:10


200 MG


 


 Gabapentin


  (Neurontin Cap)  300 mg  BID


 PO  1/11/18 20:00


 2/10/18 19:59  1/12/18 08:10


300 MG











Physical Exam











  Date Time  Temp Pulse Resp B/P (MAP) Pulse Ox O2 Delivery O2 Flow Rate FiO2


 


1/12/18 11:21 36.8 100 20 133/66 (88) 94 Room Air  


 


1/12/18 08:15      Room Air  


 


1/12/18 07:48 36.4 101 20 144/81 (102) 92 Room Air  


 


1/12/18 00:00      Room Air  


 


1/11/18 19:52 36.8 114 20 122/71 (88) 95 Room Air  


 


1/11/18 16:00      Room Air  


 


1/11/18 15:10 36.4 104 20 150/84 (106) 93 Room Air  








General Appearance:  no apparent distress, + pertinent finding (elderly, 

somewhat frail)


ENT:  hearing grossly normal


Neck:  supple, no JVD


Respiratory:  lungs clear, no respiratory distress, no accessory muscle use


Cardiovascular:  regular rate, rhythm, no edema, + normal peripheral pulses


Abdomen:  normal bowel sounds, non tender, soft


Neurologic/Psychiatric:  alert, normal mood/affect, oriented x 3





Assessment & Plan


Palliative Performance Scale:  40 %





Problem list:


Weakness


Back pain


NHL with bowel wall involvement


Prostate cancer


Supratherapeutic INR- resolved


GI bleed- resolved


Afib


Goals of care (Z51.5)





Palliative care recs:


-Patient will now be DNR/DNI, level 5 resuscitation


-POLST form completed as follows: DNR, CMO, abx with comfort as the goal, no 

artificial hydration/nutrition. Son Arnel or daughter Paloma are decision makers 

for patient in the case he is unable.


-Plan is for patient to return to [facility] today-- skilled with therapy initially 

with eventual transition to hospice. Does not wish to come back to hospital.


-Recommend Tylenol for mild pain. If pain persists/worsens, can add Roxanol PRN 

later at the [facility].





Thank you kindly for this consult. Please contact me with any further 

palliative care needs.

## 2018-01-12 NOTE — CARDIOLOGY PROGRESS NOTE
DATE: 01/12/2018

 

DATE:  01/12/2018  

 

SUBJECTIVE:  Mr. Bradford is resting comfortably in bed without complaints of

chest pain or dyspnea.  Seems somewhat confused this morning.

 

OBJECTIVE:

VITAL SIGNS:  Blood pressure 133/66 with an irregular pulse of 100. 

Respiratory rate is 20.  The patient is afebrile at 36.8 degrees Celsius. 

Saturation is 94% on room air.

NECK:  Supple with full carotid upstrokes.  No obvious bruits.  Jugular

venous pressure is flat at 90 degrees.  There is no thyromegaly.

CARDIOVASCULAR:  Reveals an irregular, irregular rhythm with distant heart

sounds.  No obvious murmurs.

LUNGS:  Clear without rales, rhonchi, or wheezes.

ABDOMEN:  Soft and nontender without bruits.

EXTREMITIES:  Reveal intact radial artery pulses bilaterally.  There is no

peripheral edema.  

 

LABORATORY DATA:   CBC notes a hemoglobin of 13.6, hematocrit 40.9, white

count 22.7, platelet count 101,000.  Electrolytes note a sodium of 139,

potassium 4.5, chloride 103, bicarbonate 21, BUN 22, creatinine 1.36, glucose

96.

 

IMPRESSION AND PLAN:

1. Peptic ulcer disease -- per the GI team.

2. Persistent atrial fibrillation -- tolerating amiodarone and low dose

metoprolol tartrate without difficulty.

3. Chronic systolic congestive heart failure -- compensated.

4. 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

5. Mild to moderate aortic insufficiency.

6. History of right popliteal thrombosis -- August 2015.

7. History of mantle cell lymphoma.

8. Prostate carcinoma.

## 2018-01-12 NOTE — MEDICAL STUDENT: [facility]
Med Student Progress Note


Date of Service


Jan 12, 2018.





Subjective


Pt evaluation today including:  conversation w/ patient, physical exam


Pain:  complains of back pain


PO Intake:  yes


Patient was sitting eating breakfast in the room when I entered.  He becomes 

easily confused with questioning.  He often must stop talking and close his 

eyes and think for some time to answer simple questions.  He believes he slept 

through the night and has an appetite for breakfast.  Due to his confusion, I 

assessed his orientation to time and place.  He did know he was in the hospital 

and the name of our hospital.  He was hesitant on the date, he thought it was 

about the 18th, or [facility] week. He did not want to tell me the year, saying that 

he doesn't know.  When I asked him to give his best guess he said 2018.





Objective


Vital Signs











  Date Time  Temp Pulse Resp B/P (MAP) Pulse Ox O2 Delivery O2 Flow Rate FiO2


 


1/12/18 07:48 36.4 101 20 144/81 (102) 92 Room Air  


 


1/12/18 00:00      Room Air  


 


1/11/18 19:52 36.8 114 20 122/71 (88) 95 Room Air  


 


1/11/18 16:00      Room Air  


 


1/11/18 15:10 36.4 104 20 150/84 (106) 93 Room Air  


 


1/11/18 11:50  102   96   


 


1/11/18 11:10 36.7 106 16 132/75 (94) 96 Room Air  











Physical Exam


General Appearance:  WD/WN, + mild distress


Respiratory/Chest:  chest non-tender, lungs clear, normal breath sounds, no 

respiratory distress, no accessory muscle use


Cardiovascular:  regular rate, rhythm, no edema, no gallop, no JVD, no murmur


Neurologic/Psychiatric:  alert, oriented x 3 (oriented but some memory confusion

)





Medications





Current Inpatient Medications








 Medications


  (Trade)  Dose


 Ordered  Sig/Maria Ines


 Route  Start Time


 Stop Time Status Last Admin


Dose Admin


 


 Nitroglycerin


  (Nitrostat Tab)  0.4 mg  UD  PRN


 SL  1/8/18 20:30


 2/7/18 20:29   


 


 


 Ondansetron HCl


  (Zofran Odt)  8 mg  Q6H  PRN


 PO  1/8/18 20:45


 2/7/18 20:44  1/8/18 22:36


8 MG


 


 Morphine Sulfate


  (MoRPHine


 SULFATE INJ)  2 mg  Q2H  PRN


 IV  1/8/18 20:45


 1/22/18 20:44  1/11/18 19:02


2 MG


 


 Pantoprazole


 Sodium


  (Protonix Tab)  40 mg  BID


 PO  1/9/18 21:00


 2/8/18 20:59  1/12/18 08:10


40 MG


 


 Sucralfate


  (Carafate Susp)  1 gm  QID


 PO  1/9/18 17:00


 2/8/18 16:59  1/12/18 08:11


1 GM


 


 Metoprolol


 Succinate


  (Toprol Xl Tab)  25 mg  QAM


 PO  1/11/18 08:00


 2/10/18 08:59  1/12/18 08:10


25 MG


 


 Amiodarone HCl


  (Cordarone Tab)  200 mg  BID


 PO  1/11/18 20:00


 2/10/18 19:59  1/12/18 08:10


200 MG


 


 Gabapentin


  (Neurontin Cap)  300 mg  BID


 PO  1/11/18 20:00


 2/10/18 19:59  1/12/18 08:10


300 MG











Assessment and Plan


Assessment and Plan:


A/P:











Elevated INR


-recived Vit. K injection for warfarin reversal 


-Discontinue warfarin for a short period of time to give ulcers time to heal 

before -o-w-j-e-z-a-u-i-n-g- -i-n- -a- -f-e-w- -w-e-e-k-s--/--m-o-n-t-h-s-.-  

reinitiating in approx. a week's time.





Stomach ulcers


-EGD performed 2 days ago showed 6 gastric ulcers


-Continue PPI


-Follow up with Hbg/Hct before discharge and again 1 week after discharge from 

hospital


-Was taking an 81 mg Aspirin, this is to be discontinued for the time being


--C-a-n- -s-t-a-r-t- -f-u-l-l- -l-i-q-u-i-d- -d-i-e-t- -t-o-d-a-y-, -a-n-d- 

-i-f- -t-o-l-r-m-c-t-e-d--,- -f-u-l-l- -d-i-e-t- -x-o-y-o-r-r-o-w- -w-a-n-t-s- 

-t-o- -h-j-u-t-i-n-u-e- -l-i-q-u-i-d- -d-i-e-t- -f-o-r- -t-h-e- -t-i-m-e- 

-b-e-i-n-g- On full PO diet 


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 

lymphoma.  Heme/Onc consulted and will wait for their input.





Elevated Troponin I and CK-MB


-Monitor for worsening symptoms


-Repeat labs until peak determined - Troponin did peak at 0.112 and declined.  

this mild elevation is considered consistent with demand ischemia

## 2018-06-01 NOTE — DIAGNOSTIC IMAGING REPORT
CHEST ONE VIEW PORTABLE



CLINICAL HISTORY: Chest pain.    



COMPARISON STUDY:  PET/CT December 13, 2017 and chest radiograph December 27, 2017.



FINDINGS: Lung volumes are normal. Mild left basilar opacity suggests

atelectasis. Note is made of moderate cardiomegaly without evidence of pulmonary

edema. There is no consolidation to suggest pneumonia. There are multiple old

bilateral rib fractures. No pneumothorax or pleural effusion is present.



IMPRESSION:  No acute cardiopulmonary findings. 









Electronically signed by:  Aleksander Hoffmann M.D.

1/2/2018 2:49 PM



Dictated Date/Time:  1/2/2018 2:48 PM (0) swallows foods and liquids w/o difficulty